# Patient Record
Sex: MALE | Race: WHITE | NOT HISPANIC OR LATINO | Employment: OTHER | URBAN - NONMETROPOLITAN AREA
[De-identification: names, ages, dates, MRNs, and addresses within clinical notes are randomized per-mention and may not be internally consistent; named-entity substitution may affect disease eponyms.]

---

## 2020-02-29 ENCOUNTER — APPOINTMENT (OUTPATIENT)
Dept: GENERAL RADIOLOGY | Facility: HOSPITAL | Age: 85
End: 2020-02-29

## 2020-02-29 ENCOUNTER — HOSPITAL ENCOUNTER (INPATIENT)
Facility: HOSPITAL | Age: 85
LOS: 5 days | Discharge: HOME HEALTH | End: 2020-03-05
Attending: EMERGENCY MEDICINE | Admitting: INTERNAL MEDICINE

## 2020-02-29 ENCOUNTER — APPOINTMENT (OUTPATIENT)
Dept: CT IMAGING | Facility: HOSPITAL | Age: 85
End: 2020-02-29

## 2020-02-29 DIAGNOSIS — R54 ADVANCED AGE: ICD-10-CM

## 2020-02-29 DIAGNOSIS — A41.9 SEPSIS WITH ACUTE HYPOXIC RESPIRATORY FAILURE, DUE TO UNSPECIFIED ORGANISM, UNSPECIFIED WHETHER SEPTIC SHOCK PRESENT (HCC): ICD-10-CM

## 2020-02-29 DIAGNOSIS — D64.9 NORMOCYTIC ANEMIA: ICD-10-CM

## 2020-02-29 DIAGNOSIS — J18.9 COMMUNITY ACQUIRED PNEUMONIA OF RIGHT LOWER LOBE OF LUNG: Primary | ICD-10-CM

## 2020-02-29 DIAGNOSIS — E88.09 HYPOALBUMINEMIA: ICD-10-CM

## 2020-02-29 DIAGNOSIS — R65.20 SEPSIS WITH ACUTE HYPOXIC RESPIRATORY FAILURE, DUE TO UNSPECIFIED ORGANISM, UNSPECIFIED WHETHER SEPTIC SHOCK PRESENT (HCC): ICD-10-CM

## 2020-02-29 DIAGNOSIS — J96.01 SEPSIS WITH ACUTE HYPOXIC RESPIRATORY FAILURE, DUE TO UNSPECIFIED ORGANISM, UNSPECIFIED WHETHER SEPTIC SHOCK PRESENT (HCC): ICD-10-CM

## 2020-02-29 DIAGNOSIS — R53.81 DEBILITY: ICD-10-CM

## 2020-02-29 DIAGNOSIS — J60 BLACK LUNG (HCC): Chronic | ICD-10-CM

## 2020-02-29 DIAGNOSIS — J96.01 ACUTE RESPIRATORY FAILURE WITH HYPOXIA (HCC): ICD-10-CM

## 2020-02-29 PROBLEM — H35.30 MACULAR DEGENERATION: Chronic | Status: ACTIVE | Noted: 2020-02-29

## 2020-02-29 PROBLEM — R77.8 ELEVATED TROPONIN: Status: ACTIVE | Noted: 2020-02-29

## 2020-02-29 PROBLEM — Z87.891 FORMER SMOKER: Chronic | Status: ACTIVE | Noted: 2020-02-29

## 2020-02-29 PROBLEM — I44.7 LBBB (LEFT BUNDLE BRANCH BLOCK): Status: ACTIVE | Noted: 2020-02-29

## 2020-02-29 PROBLEM — J43.9 EMPHYSEMA LUNG (HCC): Chronic | Status: ACTIVE | Noted: 2020-02-29

## 2020-02-29 PROBLEM — E87.20 LACTIC ACIDOSIS: Status: ACTIVE | Noted: 2020-02-29

## 2020-02-29 PROBLEM — Z86.79 HISTORY OF ABDOMINAL AORTIC ANEURYSM (AAA): Chronic | Status: ACTIVE | Noted: 2020-02-29

## 2020-02-29 PROBLEM — R79.89 ELEVATED BRAIN NATRIURETIC PEPTIDE (BNP) LEVEL: Status: ACTIVE | Noted: 2020-02-29

## 2020-02-29 LAB
A-A DO2: 104 MMHG (ref 0–300)
A-A DO2: 50.3 MMHG (ref 0–300)
ALBUMIN SERPL-MCNC: 3.21 G/DL (ref 3.5–5.2)
ALBUMIN/GLOB SERPL: 1 G/DL
ALP SERPL-CCNC: 90 U/L (ref 39–117)
ALT SERPL W P-5'-P-CCNC: 18 U/L (ref 1–41)
ANION GAP SERPL CALCULATED.3IONS-SCNC: 14 MMOL/L (ref 5–15)
ARTERIAL PATENCY WRIST A: ABNORMAL
ARTERIAL PATENCY WRIST A: POSITIVE
AST SERPL-CCNC: 22 U/L (ref 1–40)
ATMOSPHERIC PRESS: 732 MMHG
ATMOSPHERIC PRESS: 733 MMHG
B PERT DNA SPEC QL NAA+PROBE: NOT DETECTED
BASE EXCESS BLDA CALC-SCNC: -0.9 MMOL/L (ref 0–2)
BASE EXCESS BLDA CALC-SCNC: -3 MMOL/L (ref 0–2)
BASOPHILS # BLD AUTO: 0.05 10*3/MM3 (ref 0–0.2)
BASOPHILS NFR BLD AUTO: 0.4 % (ref 0–1.5)
BDY SITE: ABNORMAL
BDY SITE: ABNORMAL
BILIRUB SERPL-MCNC: 0.4 MG/DL (ref 0.2–1.2)
BODY TEMPERATURE: 0 C
BODY TEMPERATURE: 0 C
BUN BLD-MCNC: 18 MG/DL (ref 8–23)
BUN/CREAT SERPL: 18.8 (ref 7–25)
C PNEUM DNA NPH QL NAA+NON-PROBE: NOT DETECTED
CALCIUM SPEC-SCNC: 8.6 MG/DL (ref 8.2–9.6)
CHLORIDE SERPL-SCNC: 105 MMOL/L (ref 98–107)
CO2 BLDA-SCNC: 23.9 MMOL/L (ref 22–33)
CO2 BLDA-SCNC: 24.3 MMOL/L (ref 22–33)
CO2 SERPL-SCNC: 22 MMOL/L (ref 22–29)
COHGB MFR BLD: 1 % (ref 0–5)
COHGB MFR BLD: 1.2 % (ref 0–5)
CREAT BLD-MCNC: 0.96 MG/DL (ref 0.76–1.27)
D-LACTATE SERPL-SCNC: 2.4 MMOL/L (ref 0.5–2)
D-LACTATE SERPL-SCNC: 3.3 MMOL/L (ref 0.5–2)
DEPRECATED RDW RBC AUTO: 50.4 FL (ref 37–54)
EOSINOPHIL # BLD AUTO: 0.1 10*3/MM3 (ref 0–0.4)
EOSINOPHIL NFR BLD AUTO: 0.8 % (ref 0.3–6.2)
ERYTHROCYTE [DISTWIDTH] IN BLOOD BY AUTOMATED COUNT: 14.5 % (ref 12.3–15.4)
FLUAV H1 2009 PAND RNA NPH QL NAA+PROBE: NOT DETECTED
FLUAV H1 HA GENE NPH QL NAA+PROBE: NOT DETECTED
FLUAV H3 RNA NPH QL NAA+PROBE: NOT DETECTED
FLUAV SUBTYP SPEC NAA+PROBE: NOT DETECTED
FLUBV RNA ISLT QL NAA+PROBE: NOT DETECTED
GFR SERPL CREATININE-BSD FRML MDRD: 73 ML/MIN/1.73
GLOBULIN UR ELPH-MCNC: 3.1 GM/DL
GLUCOSE BLD-MCNC: 187 MG/DL (ref 65–99)
HADV DNA SPEC NAA+PROBE: NOT DETECTED
HBA1C MFR BLD: 5.8 % (ref 4.8–5.6)
HCO3 BLDA-SCNC: 22.6 MMOL/L (ref 20–26)
HCO3 BLDA-SCNC: 23.2 MMOL/L (ref 20–26)
HCOV 229E RNA SPEC QL NAA+PROBE: NOT DETECTED
HCOV HKU1 RNA SPEC QL NAA+PROBE: NOT DETECTED
HCOV NL63 RNA SPEC QL NAA+PROBE: NOT DETECTED
HCOV OC43 RNA SPEC QL NAA+PROBE: NOT DETECTED
HCT VFR BLD AUTO: 39.1 % (ref 37.5–51)
HCT VFR BLD CALC: 36.1 % (ref 38–51)
HCT VFR BLD CALC: 38.5 % (ref 38–51)
HGB BLD-MCNC: 11.7 G/DL (ref 13–17.7)
HGB BLDA-MCNC: 11.8 G/DL (ref 14–18)
HGB BLDA-MCNC: 12.6 G/DL (ref 14–18)
HMPV RNA NPH QL NAA+NON-PROBE: NOT DETECTED
HOROWITZ INDEX BLD+IHG-RTO: 21 %
HOROWITZ INDEX BLD+IHG-RTO: 32 %
HPIV1 RNA SPEC QL NAA+PROBE: NOT DETECTED
HPIV2 RNA SPEC QL NAA+PROBE: NOT DETECTED
HPIV3 RNA NPH QL NAA+PROBE: NOT DETECTED
HPIV4 P GENE NPH QL NAA+PROBE: NOT DETECTED
IMM GRANULOCYTES # BLD AUTO: 0.09 10*3/MM3 (ref 0–0.05)
IMM GRANULOCYTES NFR BLD AUTO: 0.7 % (ref 0–0.5)
LACTATE HOLD SPECIMEN: NORMAL
LYMPHOCYTES # BLD AUTO: 1.23 10*3/MM3 (ref 0.7–3.1)
LYMPHOCYTES NFR BLD AUTO: 10 % (ref 19.6–45.3)
Lab: ABNORMAL
M PNEUMO IGG SER IA-ACNC: NOT DETECTED
M PNEUMO IGM SER QL: NEGATIVE
MAGNESIUM SERPL-MCNC: 2 MG/DL (ref 1.7–2.3)
MCH RBC QN AUTO: 28.2 PG (ref 26.6–33)
MCHC RBC AUTO-ENTMCNC: 29.9 G/DL (ref 31.5–35.7)
MCV RBC AUTO: 94.2 FL (ref 79–97)
METHGB BLD QL: 0.1 % (ref 0–3)
METHGB BLD QL: 0.5 % (ref 0–3)
MODALITY: ABNORMAL
MODALITY: ABNORMAL
MONOCYTES # BLD AUTO: 1.06 10*3/MM3 (ref 0.1–0.9)
MONOCYTES NFR BLD AUTO: 8.6 % (ref 5–12)
NEUTROPHILS # BLD AUTO: 9.82 10*3/MM3 (ref 1.7–7)
NEUTROPHILS NFR BLD AUTO: 79.5 % (ref 42.7–76)
NOTE: ABNORMAL
NOTE: ABNORMAL
NOTIFIED BY: ABNORMAL
NOTIFIED WHO: ABNORMAL
NRBC BLD AUTO-RTO: 0 /100 WBC (ref 0–0.2)
NT-PROBNP SERPL-MCNC: 5965 PG/ML (ref 5–1800)
OXYHGB MFR BLDV: 87.6 % (ref 94–99)
OXYHGB MFR BLDV: 92.7 % (ref 94–99)
PCO2 BLDA: 35.8 MM HG (ref 35–45)
PCO2 BLDA: 41.4 MM HG (ref 35–45)
PCO2 TEMP ADJ BLD: ABNORMAL MM[HG]
PCO2 TEMP ADJ BLD: ABNORMAL MM[HG]
PH BLDA: 7.35 PH UNITS (ref 7.35–7.45)
PH BLDA: 7.42 PH UNITS (ref 7.35–7.45)
PH, TEMP CORRECTED: ABNORMAL
PH, TEMP CORRECTED: ABNORMAL
PLATELET # BLD AUTO: 182 10*3/MM3 (ref 140–450)
PMV BLD AUTO: 9.7 FL (ref 6–12)
PO2 BLDA: 53.2 MM HG (ref 83–108)
PO2 BLDA: 69.6 MM HG (ref 83–108)
PO2 TEMP ADJ BLD: ABNORMAL MM[HG]
PO2 TEMP ADJ BLD: ABNORMAL MM[HG]
POTASSIUM BLD-SCNC: 3.7 MMOL/L (ref 3.5–5.2)
PROT SERPL-MCNC: 6.3 G/DL (ref 6–8.5)
RBC # BLD AUTO: 4.15 10*6/MM3 (ref 4.14–5.8)
RHINOVIRUS RNA SPEC NAA+PROBE: NOT DETECTED
RSV RNA NPH QL NAA+NON-PROBE: NOT DETECTED
SAO2 % BLDCOA: 88.9 % (ref 94–99)
SAO2 % BLDCOA: 93.9 % (ref 94–99)
SODIUM BLD-SCNC: 141 MMOL/L (ref 136–145)
TROPONIN T SERPL-MCNC: 0.02 NG/ML (ref 0–0.03)
TROPONIN T SERPL-MCNC: 0.04 NG/ML (ref 0–0.03)
TROPONIN T SERPL-MCNC: 0.06 NG/ML (ref 0–0.03)
TROPONIN T SERPL-MCNC: 0.06 NG/ML (ref 0–0.03)
VENTILATOR MODE: ABNORMAL
VENTILATOR MODE: ABNORMAL
WBC NRBC COR # BLD: 12.35 10*3/MM3 (ref 3.4–10.8)

## 2020-02-29 PROCEDURE — 83605 ASSAY OF LACTIC ACID: CPT | Performed by: EMERGENCY MEDICINE

## 2020-02-29 PROCEDURE — 83036 HEMOGLOBIN GLYCOSYLATED A1C: CPT | Performed by: PHYSICIAN ASSISTANT

## 2020-02-29 PROCEDURE — 82375 ASSAY CARBOXYHB QUANT: CPT

## 2020-02-29 PROCEDURE — 93010 ELECTROCARDIOGRAM REPORT: CPT | Performed by: INTERNAL MEDICINE

## 2020-02-29 PROCEDURE — 80053 COMPREHEN METABOLIC PANEL: CPT | Performed by: EMERGENCY MEDICINE

## 2020-02-29 PROCEDURE — 25010000002 CEFTRIAXONE: Performed by: PHYSICIAN ASSISTANT

## 2020-02-29 PROCEDURE — 83735 ASSAY OF MAGNESIUM: CPT | Performed by: PHYSICIAN ASSISTANT

## 2020-02-29 PROCEDURE — 82805 BLOOD GASES W/O2 SATURATION: CPT

## 2020-02-29 PROCEDURE — 0099U HC BIOFIRE FILMARRAY RESP PANEL 1: CPT | Performed by: INTERNAL MEDICINE

## 2020-02-29 PROCEDURE — 25010000002 PROMETHAZINE PER 50 MG: Performed by: PHYSICIAN ASSISTANT

## 2020-02-29 PROCEDURE — 99285 EMERGENCY DEPT VISIT HI MDM: CPT

## 2020-02-29 PROCEDURE — 36600 WITHDRAWAL OF ARTERIAL BLOOD: CPT

## 2020-02-29 PROCEDURE — 93005 ELECTROCARDIOGRAM TRACING: CPT | Performed by: PHYSICIAN ASSISTANT

## 2020-02-29 PROCEDURE — 94799 UNLISTED PULMONARY SVC/PX: CPT

## 2020-02-29 PROCEDURE — 71045 X-RAY EXAM CHEST 1 VIEW: CPT | Performed by: RADIOLOGY

## 2020-02-29 PROCEDURE — 71250 CT THORAX DX C-: CPT

## 2020-02-29 PROCEDURE — 85025 COMPLETE CBC W/AUTO DIFF WBC: CPT | Performed by: EMERGENCY MEDICINE

## 2020-02-29 PROCEDURE — 83880 ASSAY OF NATRIURETIC PEPTIDE: CPT | Performed by: EMERGENCY MEDICINE

## 2020-02-29 PROCEDURE — 99223 1ST HOSP IP/OBS HIGH 75: CPT | Performed by: INTERNAL MEDICINE

## 2020-02-29 PROCEDURE — 86738 MYCOPLASMA ANTIBODY: CPT | Performed by: INTERNAL MEDICINE

## 2020-02-29 PROCEDURE — 25010000002 HEPARIN (PORCINE) PER 1000 UNITS: Performed by: INTERNAL MEDICINE

## 2020-02-29 PROCEDURE — 83050 HGB METHEMOGLOBIN QUAN: CPT

## 2020-02-29 PROCEDURE — 87040 BLOOD CULTURE FOR BACTERIA: CPT | Performed by: EMERGENCY MEDICINE

## 2020-02-29 PROCEDURE — 84484 ASSAY OF TROPONIN QUANT: CPT | Performed by: INTERNAL MEDICINE

## 2020-02-29 PROCEDURE — 84145 PROCALCITONIN (PCT): CPT | Performed by: INTERNAL MEDICINE

## 2020-02-29 PROCEDURE — 71045 X-RAY EXAM CHEST 1 VIEW: CPT

## 2020-02-29 PROCEDURE — 94640 AIRWAY INHALATION TREATMENT: CPT

## 2020-02-29 PROCEDURE — 84484 ASSAY OF TROPONIN QUANT: CPT | Performed by: EMERGENCY MEDICINE

## 2020-02-29 PROCEDURE — 93005 ELECTROCARDIOGRAM TRACING: CPT | Performed by: INTERNAL MEDICINE

## 2020-02-29 PROCEDURE — 25010000002 LEVOFLOXACIN PER 250 MG: Performed by: EMERGENCY MEDICINE

## 2020-02-29 PROCEDURE — 93005 ELECTROCARDIOGRAM TRACING: CPT | Performed by: EMERGENCY MEDICINE

## 2020-02-29 RX ORDER — FAMOTIDINE 20 MG/1
20 TABLET, FILM COATED ORAL DAILY
Status: DISCONTINUED | OUTPATIENT
Start: 2020-02-29 | End: 2020-03-05 | Stop reason: HOSPADM

## 2020-02-29 RX ORDER — BRIMONIDINE TARTRATE 2 MG/ML
1 SOLUTION/ DROPS OPHTHALMIC 2 TIMES DAILY
Status: ON HOLD | COMMUNITY
End: 2020-02-29

## 2020-02-29 RX ORDER — NITROGLYCERIN 0.4 MG/1
0.4 TABLET SUBLINGUAL
Status: CANCELLED | OUTPATIENT
Start: 2020-02-29

## 2020-02-29 RX ORDER — ACETAMINOPHEN 325 MG/1
650 TABLET ORAL ONCE
Status: COMPLETED | OUTPATIENT
Start: 2020-02-29 | End: 2020-02-29

## 2020-02-29 RX ORDER — METHYLPREDNISOLONE SODIUM SUCCINATE 40 MG/ML
20 INJECTION, POWDER, LYOPHILIZED, FOR SOLUTION INTRAMUSCULAR; INTRAVENOUS ONCE
Status: COMPLETED | OUTPATIENT
Start: 2020-03-01 | End: 2020-02-29

## 2020-02-29 RX ORDER — GUAIFENESIN 200 MG/1
400 TABLET ORAL 2 TIMES DAILY
Status: ON HOLD | COMMUNITY
End: 2020-02-29

## 2020-02-29 RX ORDER — MULTIVITAMIN
1 TABLET ORAL DAILY
Status: DISCONTINUED | OUTPATIENT
Start: 2020-02-29 | End: 2020-03-05 | Stop reason: HOSPADM

## 2020-02-29 RX ORDER — FUROSEMIDE 20 MG/1
20 TABLET ORAL DAILY
Status: CANCELLED | OUTPATIENT
Start: 2020-02-29

## 2020-02-29 RX ORDER — PREDNISONE 20 MG/1
20 TABLET ORAL DAILY
Status: ON HOLD | COMMUNITY
End: 2020-02-29

## 2020-02-29 RX ORDER — POTASSIUM CHLORIDE 750 MG/1
10 TABLET, FILM COATED, EXTENDED RELEASE ORAL DAILY
Status: CANCELLED | OUTPATIENT
Start: 2020-02-29

## 2020-02-29 RX ORDER — NAPROXEN 375 MG/1
375 TABLET ORAL 2 TIMES DAILY PRN
COMMUNITY
End: 2020-03-05 | Stop reason: HOSPADM

## 2020-02-29 RX ORDER — ACETAMINOPHEN 325 MG/1
650 TABLET ORAL EVERY 6 HOURS PRN
Status: DISCONTINUED | OUTPATIENT
Start: 2020-02-29 | End: 2020-03-05 | Stop reason: HOSPADM

## 2020-02-29 RX ORDER — ASPIRIN 81 MG/1
324 TABLET, CHEWABLE ORAL ONCE
Status: DISCONTINUED | OUTPATIENT
Start: 2020-02-29 | End: 2020-02-29

## 2020-02-29 RX ORDER — ALBUTEROL SULFATE 90 UG/1
2 AEROSOL, METERED RESPIRATORY (INHALATION) EVERY 6 HOURS PRN
COMMUNITY

## 2020-02-29 RX ORDER — IPRATROPIUM BROMIDE AND ALBUTEROL SULFATE 2.5; .5 MG/3ML; MG/3ML
3 SOLUTION RESPIRATORY (INHALATION) EVERY 4 HOURS PRN
Status: CANCELLED | OUTPATIENT
Start: 2020-02-29

## 2020-02-29 RX ORDER — AMIODARONE HYDROCHLORIDE 200 MG/1
200 TABLET ORAL DAILY
COMMUNITY
End: 2020-03-05 | Stop reason: HOSPADM

## 2020-02-29 RX ORDER — POTASSIUM CHLORIDE 750 MG/1
10 TABLET, FILM COATED, EXTENDED RELEASE ORAL DAILY
COMMUNITY

## 2020-02-29 RX ORDER — PROMETHAZINE HYDROCHLORIDE 25 MG/ML
INJECTION, SOLUTION INTRAMUSCULAR; INTRAVENOUS
Status: DISPENSED
Start: 2020-02-29 | End: 2020-03-01

## 2020-02-29 RX ORDER — SODIUM CHLORIDE 0.9 % (FLUSH) 0.9 %
10 SYRINGE (ML) INJECTION AS NEEDED
Status: DISCONTINUED | OUTPATIENT
Start: 2020-02-29 | End: 2020-03-05 | Stop reason: HOSPADM

## 2020-02-29 RX ORDER — ATORVASTATIN CALCIUM 40 MG/1
40 TABLET, FILM COATED ORAL NIGHTLY
Status: DISCONTINUED | OUTPATIENT
Start: 2020-02-29 | End: 2020-03-05 | Stop reason: HOSPADM

## 2020-02-29 RX ORDER — HYDROXYZINE HYDROCHLORIDE 25 MG/1
25 TABLET, FILM COATED ORAL 3 TIMES DAILY PRN
Status: DISCONTINUED | OUTPATIENT
Start: 2020-02-29 | End: 2020-03-05 | Stop reason: HOSPADM

## 2020-02-29 RX ORDER — AMOXICILLIN 500 MG/1
1000 CAPSULE ORAL 2 TIMES DAILY
Status: ON HOLD | COMMUNITY
End: 2020-02-29

## 2020-02-29 RX ORDER — CEFDINIR 300 MG/1
300 CAPSULE ORAL 2 TIMES DAILY
Status: ON HOLD | COMMUNITY
End: 2020-02-29

## 2020-02-29 RX ORDER — DOXYCYCLINE HYCLATE 100 MG/1
100 CAPSULE ORAL 2 TIMES DAILY
Status: ON HOLD | COMMUNITY
End: 2020-02-29

## 2020-02-29 RX ORDER — FUROSEMIDE 20 MG/1
20 TABLET ORAL DAILY
COMMUNITY
End: 2020-03-05 | Stop reason: HOSPADM

## 2020-02-29 RX ORDER — ACETAMINOPHEN AND CODEINE PHOSPHATE 300; 30 MG/1; MG/1
1 TABLET ORAL EVERY 8 HOURS PRN
Status: ON HOLD | COMMUNITY
End: 2020-02-29

## 2020-02-29 RX ORDER — NITROGLYCERIN 0.4 MG/1
0.4 TABLET SUBLINGUAL
COMMUNITY

## 2020-02-29 RX ORDER — LEVOFLOXACIN 5 MG/ML
750 INJECTION, SOLUTION INTRAVENOUS ONCE
Status: COMPLETED | OUTPATIENT
Start: 2020-02-29 | End: 2020-02-29

## 2020-02-29 RX ORDER — SODIUM CHLORIDE 0.9 % (FLUSH) 0.9 %
10 SYRINGE (ML) INJECTION EVERY 12 HOURS SCHEDULED
Status: DISCONTINUED | OUTPATIENT
Start: 2020-02-29 | End: 2020-03-05 | Stop reason: HOSPADM

## 2020-02-29 RX ORDER — NAPROXEN 250 MG/1
375 TABLET ORAL 2 TIMES DAILY PRN
Status: CANCELLED | OUTPATIENT
Start: 2020-02-29

## 2020-02-29 RX ORDER — NITROGLYCERIN 0.4 MG/1
0.4 TABLET SUBLINGUAL
Status: DISCONTINUED | OUTPATIENT
Start: 2020-02-29 | End: 2020-03-05 | Stop reason: HOSPADM

## 2020-02-29 RX ORDER — GUAIFENESIN 600 MG/1
600 TABLET, EXTENDED RELEASE ORAL EVERY 12 HOURS SCHEDULED
Status: DISCONTINUED | OUTPATIENT
Start: 2020-03-01 | End: 2020-03-05 | Stop reason: HOSPADM

## 2020-02-29 RX ORDER — ASPIRIN 81 MG/1
81 TABLET, CHEWABLE ORAL DAILY
Status: DISCONTINUED | OUTPATIENT
Start: 2020-02-29 | End: 2020-03-05 | Stop reason: HOSPADM

## 2020-02-29 RX ORDER — IPRATROPIUM BROMIDE AND ALBUTEROL SULFATE 2.5; .5 MG/3ML; MG/3ML
3 SOLUTION RESPIRATORY (INHALATION) EVERY 4 HOURS PRN
COMMUNITY

## 2020-02-29 RX ORDER — HEPARIN SODIUM 5000 [USP'U]/ML
5000 INJECTION, SOLUTION INTRAVENOUS; SUBCUTANEOUS EVERY 8 HOURS SCHEDULED
Status: DISCONTINUED | OUTPATIENT
Start: 2020-02-29 | End: 2020-03-05 | Stop reason: HOSPADM

## 2020-02-29 RX ADMIN — LEVOFLOXACIN 750 MG: 5 INJECTION, SOLUTION INTRAVENOUS at 13:52

## 2020-02-29 RX ADMIN — ATORVASTATIN CALCIUM 40 MG: 40 TABLET, FILM COATED ORAL at 20:59

## 2020-02-29 RX ADMIN — SODIUM CHLORIDE, PRESERVATIVE FREE 10 ML: 5 INJECTION INTRAVENOUS at 20:51

## 2020-02-29 RX ADMIN — IPRATROPIUM BROMIDE 0.5 MG: 0.5 SOLUTION RESPIRATORY (INHALATION) at 23:07

## 2020-02-29 RX ADMIN — ACETAMINOPHEN 650 MG: 325 TABLET ORAL at 09:21

## 2020-02-29 RX ADMIN — Medication 1 TABLET: at 18:13

## 2020-02-29 RX ADMIN — FAMOTIDINE 20 MG: 20 TABLET, FILM COATED ORAL at 20:50

## 2020-02-29 RX ADMIN — SODIUM CHLORIDE 1000 ML: 9 INJECTION, SOLUTION INTRAVENOUS at 08:15

## 2020-02-29 RX ADMIN — IPRATROPIUM BROMIDE 0.5 MG: 0.5 SOLUTION RESPIRATORY (INHALATION) at 19:28

## 2020-02-29 RX ADMIN — ASPIRIN 81 MG: 81 TABLET, CHEWABLE ORAL at 21:00

## 2020-02-29 RX ADMIN — NITROGLYCERIN 0.5 INCH: 20 OINTMENT TOPICAL at 15:47

## 2020-02-29 RX ADMIN — CEFTRIAXONE 1 G: 1 INJECTION, POWDER, FOR SOLUTION INTRAMUSCULAR; INTRAVENOUS at 20:50

## 2020-02-29 RX ADMIN — HEPARIN SODIUM 5000 UNITS: 5000 INJECTION INTRAVENOUS; SUBCUTANEOUS at 21:00

## 2020-02-29 RX ADMIN — SODIUM CHLORIDE 839 ML: 9 INJECTION, SOLUTION INTRAVENOUS at 13:51

## 2020-02-29 RX ADMIN — DOXYCYCLINE 100 MG: 100 INJECTION, POWDER, LYOPHILIZED, FOR SOLUTION INTRAVENOUS at 20:50

## 2020-02-29 RX ADMIN — METHYLPREDNISOLONE SODIUM SUCCINATE 20 MG: 40 INJECTION, POWDER, FOR SOLUTION INTRAMUSCULAR; INTRAVENOUS at 23:32

## 2020-02-29 RX ADMIN — PROMETHAZINE HYDROCHLORIDE 12.5 MG: 25 INJECTION INTRAMUSCULAR; INTRAVENOUS at 23:32

## 2020-03-01 ENCOUNTER — APPOINTMENT (OUTPATIENT)
Dept: CARDIOLOGY | Facility: HOSPITAL | Age: 85
End: 2020-03-01

## 2020-03-01 LAB
ANION GAP SERPL CALCULATED.3IONS-SCNC: 13.8 MMOL/L (ref 5–15)
BASOPHILS # BLD AUTO: 0.06 10*3/MM3 (ref 0–0.2)
BASOPHILS NFR BLD AUTO: 0.4 % (ref 0–1.5)
BH CV ECHO MEAS - ACS: 0.8 CM
BH CV ECHO MEAS - AI DEC SLOPE: 460 CM/SEC^2
BH CV ECHO MEAS - AI MAX PG: 59.9 MMHG
BH CV ECHO MEAS - AI MAX VEL: 387 CM/SEC
BH CV ECHO MEAS - AI P1/2T: 246.4 MSEC
BH CV ECHO MEAS - AO MAX PG: 19 MMHG
BH CV ECHO MEAS - AO MEAN PG: 10 MMHG
BH CV ECHO MEAS - AO ROOT AREA (BSA CORRECTED): 2.1
BH CV ECHO MEAS - AO ROOT AREA: 10.8 CM^2
BH CV ECHO MEAS - AO ROOT DIAM: 3.7 CM
BH CV ECHO MEAS - AO V2 MAX: 218 CM/SEC
BH CV ECHO MEAS - AO V2 MEAN: 144 CM/SEC
BH CV ECHO MEAS - AO V2 VTI: 36.4 CM
BH CV ECHO MEAS - BSA(HAYCOCK): 1.8 M^2
BH CV ECHO MEAS - BSA: 1.8 M^2
BH CV ECHO MEAS - BZI_BMI: 21.9 KILOGRAMS/M^2
BH CV ECHO MEAS - BZI_METRIC_HEIGHT: 172.7 CM
BH CV ECHO MEAS - BZI_METRIC_WEIGHT: 65.3 KG
BH CV ECHO MEAS - EDV(CUBED): 219.3 ML
BH CV ECHO MEAS - EDV(MOD-SP4): 96 ML
BH CV ECHO MEAS - EDV(TEICH): 182.1 ML
BH CV ECHO MEAS - EF(CUBED): 65.2 %
BH CV ECHO MEAS - EF(MOD-BP): 38 %
BH CV ECHO MEAS - EF(MOD-SP4): 38.3 %
BH CV ECHO MEAS - EF(TEICH): 55.9 %
BH CV ECHO MEAS - ESV(CUBED): 76.2 ML
BH CV ECHO MEAS - ESV(MOD-SP4): 59.2 ML
BH CV ECHO MEAS - ESV(TEICH): 80.4 ML
BH CV ECHO MEAS - FS: 29.7 %
BH CV ECHO MEAS - IVS/LVPW: 0.75
BH CV ECHO MEAS - IVSD: 0.92 CM
BH CV ECHO MEAS - LA DIMENSION: 4.1 CM
BH CV ECHO MEAS - LA/AO: 1.1
BH CV ECHO MEAS - LV DIASTOLIC VOL/BSA (35-75): 54 ML/M^2
BH CV ECHO MEAS - LV MASS(C)D: 274.2 GRAMS
BH CV ECHO MEAS - LV MASS(C)DI: 154.3 GRAMS/M^2
BH CV ECHO MEAS - LV SYSTOLIC VOL/BSA (12-30): 33.3 ML/M^2
BH CV ECHO MEAS - LVIDD: 6 CM
BH CV ECHO MEAS - LVIDS: 4.2 CM
BH CV ECHO MEAS - LVLD AP4: 7.9 CM
BH CV ECHO MEAS - LVLS AP4: 7.4 CM
BH CV ECHO MEAS - LVOT AREA (M): 2.8 CM^2
BH CV ECHO MEAS - LVOT AREA: 2.8 CM^2
BH CV ECHO MEAS - LVOT DIAM: 1.9 CM
BH CV ECHO MEAS - LVPWD: 1.2 CM
BH CV ECHO MEAS - MV E MAX VEL: 133 CM/SEC
BH CV ECHO MEAS - PA ACC TIME: 0.12 SEC
BH CV ECHO MEAS - PA PR(ACCEL): 23.7 MMHG
BH CV ECHO MEAS - RAP SYSTOLE: 10 MMHG
BH CV ECHO MEAS - RVSP: 44.3 MMHG
BH CV ECHO MEAS - SI(AO): 220.2 ML/M^2
BH CV ECHO MEAS - SI(CUBED): 80.5 ML/M^2
BH CV ECHO MEAS - SI(MOD-SP4): 20.7 ML/M^2
BH CV ECHO MEAS - SI(TEICH): 57.2 ML/M^2
BH CV ECHO MEAS - SV(AO): 391.4 ML
BH CV ECHO MEAS - SV(CUBED): 143 ML
BH CV ECHO MEAS - SV(MOD-SP4): 36.8 ML
BH CV ECHO MEAS - SV(TEICH): 101.7 ML
BH CV ECHO MEAS - TR MAX VEL: 293 CM/SEC
BUN BLD-MCNC: 17 MG/DL (ref 8–23)
BUN/CREAT SERPL: 18.5 (ref 7–25)
CALCIUM SPEC-SCNC: 8.8 MG/DL (ref 8.2–9.6)
CHLORIDE SERPL-SCNC: 107 MMOL/L (ref 98–107)
CO2 SERPL-SCNC: 23.2 MMOL/L (ref 22–29)
CREAT BLD-MCNC: 0.92 MG/DL (ref 0.76–1.27)
DEPRECATED RDW RBC AUTO: 50.2 FL (ref 37–54)
EOSINOPHIL # BLD AUTO: 0.03 10*3/MM3 (ref 0–0.4)
EOSINOPHIL NFR BLD AUTO: 0.2 % (ref 0.3–6.2)
ERYTHROCYTE [DISTWIDTH] IN BLOOD BY AUTOMATED COUNT: 14.4 % (ref 12.3–15.4)
GFR SERPL CREATININE-BSD FRML MDRD: 76 ML/MIN/1.73
GLUCOSE BLD-MCNC: 154 MG/DL (ref 65–99)
HCT VFR BLD AUTO: 40.6 % (ref 37.5–51)
HGB BLD-MCNC: 12.1 G/DL (ref 13–17.7)
IMM GRANULOCYTES # BLD AUTO: 0.13 10*3/MM3 (ref 0–0.05)
IMM GRANULOCYTES NFR BLD AUTO: 0.8 % (ref 0–0.5)
LYMPHOCYTES # BLD AUTO: 0.5 10*3/MM3 (ref 0.7–3.1)
LYMPHOCYTES NFR BLD AUTO: 3.1 % (ref 19.6–45.3)
MAGNESIUM SERPL-MCNC: 2 MG/DL (ref 1.7–2.3)
MAXIMAL PREDICTED HEART RATE: 124 BPM
MCH RBC QN AUTO: 28 PG (ref 26.6–33)
MCHC RBC AUTO-ENTMCNC: 29.8 G/DL (ref 31.5–35.7)
MCV RBC AUTO: 94 FL (ref 79–97)
MONOCYTES # BLD AUTO: 0.82 10*3/MM3 (ref 0.1–0.9)
MONOCYTES NFR BLD AUTO: 5.1 % (ref 5–12)
NEUTROPHILS # BLD AUTO: 14.55 10*3/MM3 (ref 1.7–7)
NEUTROPHILS NFR BLD AUTO: 90.4 % (ref 42.7–76)
NRBC BLD AUTO-RTO: 0 /100 WBC (ref 0–0.2)
NT-PROBNP SERPL-MCNC: 7741 PG/ML (ref 5–1800)
PHOSPHATE SERPL-MCNC: 3.1 MG/DL (ref 2.5–4.5)
PLATELET # BLD AUTO: 213 10*3/MM3 (ref 140–450)
PMV BLD AUTO: 9.6 FL (ref 6–12)
POTASSIUM BLD-SCNC: 4.3 MMOL/L (ref 3.5–5.2)
PROCALCITONIN SERPL-MCNC: 0.14 NG/ML (ref 0.1–0.25)
RBC # BLD AUTO: 4.32 10*6/MM3 (ref 4.14–5.8)
SODIUM BLD-SCNC: 144 MMOL/L (ref 136–145)
STRESS TARGET HR: 105 BPM
TROPONIN T SERPL-MCNC: 0.06 NG/ML (ref 0–0.03)
TROPONIN T SERPL-MCNC: 0.06 NG/ML (ref 0–0.03)
TROPONIN T SERPL-MCNC: 0.07 NG/ML (ref 0–0.03)
TSH SERPL DL<=0.05 MIU/L-ACNC: 2.4 UIU/ML (ref 0.27–4.2)
WBC NRBC COR # BLD: 16.09 10*3/MM3 (ref 3.4–10.8)

## 2020-03-01 PROCEDURE — 99222 1ST HOSP IP/OBS MODERATE 55: CPT | Performed by: INTERNAL MEDICINE

## 2020-03-01 PROCEDURE — 84443 ASSAY THYROID STIM HORMONE: CPT | Performed by: INTERNAL MEDICINE

## 2020-03-01 PROCEDURE — 93306 TTE W/DOPPLER COMPLETE: CPT

## 2020-03-01 PROCEDURE — 84100 ASSAY OF PHOSPHORUS: CPT | Performed by: PHYSICIAN ASSISTANT

## 2020-03-01 PROCEDURE — 84484 ASSAY OF TROPONIN QUANT: CPT | Performed by: PHYSICIAN ASSISTANT

## 2020-03-01 PROCEDURE — 25010000002 HEPARIN (PORCINE) PER 1000 UNITS: Performed by: INTERNAL MEDICINE

## 2020-03-01 PROCEDURE — 25010000002 CEFTRIAXONE: Performed by: PHYSICIAN ASSISTANT

## 2020-03-01 PROCEDURE — 93325 DOPPLER ECHO COLOR FLOW MAPG: CPT | Performed by: INTERNAL MEDICINE

## 2020-03-01 PROCEDURE — 93005 ELECTROCARDIOGRAM TRACING: CPT | Performed by: PHYSICIAN ASSISTANT

## 2020-03-01 PROCEDURE — 94799 UNLISTED PULMONARY SVC/PX: CPT

## 2020-03-01 PROCEDURE — 93308 TTE F-UP OR LMTD: CPT | Performed by: INTERNAL MEDICINE

## 2020-03-01 PROCEDURE — 83735 ASSAY OF MAGNESIUM: CPT | Performed by: PHYSICIAN ASSISTANT

## 2020-03-01 PROCEDURE — 93010 ELECTROCARDIOGRAM REPORT: CPT | Performed by: INTERNAL MEDICINE

## 2020-03-01 PROCEDURE — 99233 SBSQ HOSP IP/OBS HIGH 50: CPT | Performed by: PHYSICIAN ASSISTANT

## 2020-03-01 PROCEDURE — 25010000002 METHYLPREDNISOLONE PER 40 MG: Performed by: PHYSICIAN ASSISTANT

## 2020-03-01 PROCEDURE — 93321 DOPPLER ECHO F-UP/LMTD STD: CPT | Performed by: INTERNAL MEDICINE

## 2020-03-01 PROCEDURE — 85025 COMPLETE CBC W/AUTO DIFF WBC: CPT | Performed by: INTERNAL MEDICINE

## 2020-03-01 PROCEDURE — 25010000002 FUROSEMIDE PER 20 MG: Performed by: PHYSICIAN ASSISTANT

## 2020-03-01 PROCEDURE — 80048 BASIC METABOLIC PNL TOTAL CA: CPT | Performed by: INTERNAL MEDICINE

## 2020-03-01 PROCEDURE — 83880 ASSAY OF NATRIURETIC PEPTIDE: CPT | Performed by: PHYSICIAN ASSISTANT

## 2020-03-01 RX ORDER — AMIODARONE HYDROCHLORIDE 200 MG/1
200 TABLET ORAL DAILY
Status: DISCONTINUED | OUTPATIENT
Start: 2020-03-01 | End: 2020-03-01

## 2020-03-01 RX ORDER — CARVEDILOL 3.12 MG/1
3.12 TABLET ORAL 2 TIMES DAILY WITH MEALS
Status: DISCONTINUED | OUTPATIENT
Start: 2020-03-01 | End: 2020-03-05 | Stop reason: HOSPADM

## 2020-03-01 RX ORDER — IPRATROPIUM BROMIDE AND ALBUTEROL SULFATE 2.5; .5 MG/3ML; MG/3ML
3 SOLUTION RESPIRATORY (INHALATION) EVERY 4 HOURS PRN
Status: DISCONTINUED | OUTPATIENT
Start: 2020-03-01 | End: 2020-03-05 | Stop reason: HOSPADM

## 2020-03-01 RX ORDER — QUETIAPINE FUMARATE 25 MG/1
25 TABLET, FILM COATED ORAL ONCE
Status: COMPLETED | OUTPATIENT
Start: 2020-03-01 | End: 2020-03-01

## 2020-03-01 RX ORDER — FUROSEMIDE 10 MG/ML
40 INJECTION INTRAMUSCULAR; INTRAVENOUS ONCE
Status: COMPLETED | OUTPATIENT
Start: 2020-03-01 | End: 2020-03-01

## 2020-03-01 RX ORDER — TORSEMIDE 20 MG/1
20 TABLET ORAL DAILY
Status: DISCONTINUED | OUTPATIENT
Start: 2020-03-01 | End: 2020-03-02

## 2020-03-01 RX ORDER — ALBUTEROL SULFATE 2.5 MG/3ML
2.5 SOLUTION RESPIRATORY (INHALATION) EVERY 6 HOURS PRN
Status: DISCONTINUED | OUTPATIENT
Start: 2020-03-01 | End: 2020-03-05 | Stop reason: HOSPADM

## 2020-03-01 RX ADMIN — Medication 1 TABLET: at 09:03

## 2020-03-01 RX ADMIN — SACUBITRIL AND VALSARTAN 1 TABLET: 24; 26 TABLET, FILM COATED ORAL at 21:09

## 2020-03-01 RX ADMIN — IPRATROPIUM BROMIDE 0.5 MG: 0.5 SOLUTION RESPIRATORY (INHALATION) at 18:54

## 2020-03-01 RX ADMIN — ASPIRIN 81 MG: 81 TABLET, CHEWABLE ORAL at 09:03

## 2020-03-01 RX ADMIN — CARVEDILOL 3.12 MG: 3.12 TABLET, FILM COATED ORAL at 12:15

## 2020-03-01 RX ADMIN — GUAIFENESIN 600 MG: 600 TABLET, EXTENDED RELEASE ORAL at 00:32

## 2020-03-01 RX ADMIN — TORSEMIDE 20 MG: 20 TABLET ORAL at 12:15

## 2020-03-01 RX ADMIN — CARVEDILOL 3.12 MG: 3.12 TABLET, FILM COATED ORAL at 16:12

## 2020-03-01 RX ADMIN — ACETAMINOPHEN 650 MG: 325 TABLET ORAL at 21:09

## 2020-03-01 RX ADMIN — SACUBITRIL AND VALSARTAN 1 TABLET: 24; 26 TABLET, FILM COATED ORAL at 12:15

## 2020-03-01 RX ADMIN — HEPARIN SODIUM 5000 UNITS: 5000 INJECTION INTRAVENOUS; SUBCUTANEOUS at 21:10

## 2020-03-01 RX ADMIN — HYDROXYZINE HYDROCHLORIDE 25 MG: 25 TABLET, FILM COATED ORAL at 21:09

## 2020-03-01 RX ADMIN — SODIUM CHLORIDE, PRESERVATIVE FREE 10 ML: 5 INJECTION INTRAVENOUS at 21:10

## 2020-03-01 RX ADMIN — QUETIAPINE FUMARATE 25 MG: 25 TABLET, FILM COATED ORAL at 01:30

## 2020-03-01 RX ADMIN — HEPARIN SODIUM 5000 UNITS: 5000 INJECTION INTRAVENOUS; SUBCUTANEOUS at 05:04

## 2020-03-01 RX ADMIN — GUAIFENESIN 600 MG: 600 TABLET, EXTENDED RELEASE ORAL at 21:09

## 2020-03-01 RX ADMIN — ATORVASTATIN CALCIUM 40 MG: 40 TABLET, FILM COATED ORAL at 21:07

## 2020-03-01 RX ADMIN — FUROSEMIDE 40 MG: 10 INJECTION, SOLUTION INTRAMUSCULAR; INTRAVENOUS at 09:03

## 2020-03-01 RX ADMIN — GUAIFENESIN 600 MG: 600 TABLET, EXTENDED RELEASE ORAL at 09:03

## 2020-03-01 RX ADMIN — IPRATROPIUM BROMIDE 0.5 MG: 0.5 SOLUTION RESPIRATORY (INHALATION) at 06:43

## 2020-03-01 RX ADMIN — DOXYCYCLINE 100 MG: 100 INJECTION, POWDER, LYOPHILIZED, FOR SOLUTION INTRAVENOUS at 09:08

## 2020-03-01 RX ADMIN — CEFTRIAXONE 1 G: 1 INJECTION, POWDER, FOR SOLUTION INTRAMUSCULAR; INTRAVENOUS at 21:07

## 2020-03-01 RX ADMIN — HYDROXYZINE HYDROCHLORIDE 25 MG: 25 TABLET, FILM COATED ORAL at 00:01

## 2020-03-01 RX ADMIN — IPRATROPIUM BROMIDE 0.5 MG: 0.5 SOLUTION RESPIRATORY (INHALATION) at 13:17

## 2020-03-01 RX ADMIN — FAMOTIDINE 20 MG: 20 TABLET, FILM COATED ORAL at 09:03

## 2020-03-01 NOTE — PLAN OF CARE
Patient confused during shift. New medications ordered per cardiology for heart failure. Bed alarm on due to high fall risk and confusion.

## 2020-03-01 NOTE — CONSULTS
Patient Name: Rafael Hussein  Age/Sex: 89 y.o. male  : 1923  MRN: [id number]    Date of Hospital Visit: 2020  Encounter Provider: Philip Forrest MD  Referring Provider: No ref. provider found         Subjective:       Chief Complaint: CHF    History of Present Illness:  Rafael Hussein is a 89 y.o. male who came in to the hospital with SOB and chest pain, the history is not clear due to his confusion. CXR consistent with CHF and EKG sinus with LBBB his Troponin are mildly elevated and flat. He apparently has CAD and prior stents,  His echo today showed Ef 25 to 305 with global HK, Moderate AI and moderate MR, , will treat as HF low EF, will add Entresto, Coreg, low dose due to electrical abnormalities, and diuretics, he did received lasix 40 Iv today.. At the time of my visit,he denies any chest pain or SOB, no edema will also add torsemide 20 mg, reviewing his home meds, he seems was taking Amiodarone ,/ but no anticoagulation no family members, at bedside,       Past Medical History:  Past Medical History:   Diagnosis Date   • Aortic aneurysm (CMS/Formerly [facility])    • Black lung (CMS/Formerly [facility])    • Emphysema lung (CMS/Formerly [facility]) 2020   • History of abdominal aortic aneurysm (AAA) 2020   • Macular degeneration 2020       Past Surgical History:   Procedure Laterality Date   • ABDOMINAL AORTIC ANEURYSM REPAIR     • CARDIAC CATHETERIZATION      x3        Home Medications:    Medications Prior to Admission   Medication Sig Dispense Refill Last Dose   • amiodarone (PACERONE) 200 MG tablet Take 200 mg by mouth Daily.   2020 at am   • furosemide (LASIX) 20 MG tablet Take 20 mg by mouth Daily.   2020 at am   • ipratropium-albuterol (DUO-NEB) 0.5-2.5 mg/3 ml nebulizer Take 3 mL by nebulization Every 4 (Four) Hours As Needed for Wheezing.   2020 at pm   • naproxen (NAPROSYN) 375 MG tablet Take 375 mg by mouth 2 (Two) Times a Day As Needed for Mild Pain .   2020 at pm   • potassium chloride  (K-DUR) 10 MEQ CR tablet Take 10 mEq by mouth Daily.   2020 at pm   • albuterol sulfate  (90 Base) MCG/ACT inhaler Inhale 2 puffs Every 6 (Six) Hours As Needed for Wheezing.   Unknown at Unknown time   • nitroglycerin (NITROSTAT) 0.4 MG SL tablet Place 0.4 mg under the tongue Every 5 (Five) Minutes As Needed for Chest Pain. Take no more than 3 doses in 15 minutes.   Unknown at Unknown time       Allergies:  Allergies   Allergen Reactions   • Penicillins Unknown - Low Severity       Past Social History:  Social History     Socioeconomic History   • Marital status:      Spouse name: Not on file   • Number of children: Not on file   • Years of education: Not on file   • Highest education level: Not on file   Tobacco Use   • Smoking status: Former Smoker     Types: Cigarettes, Pipe     Last attempt to quit: 1989     Years since quittin.0   • Smokeless tobacco: Never Used   Substance and Sexual Activity   • Alcohol use: Never     Frequency: Never   • Drug use: Never   • Sexual activity: Defer       Past Family History:  History reviewed. No pertinent family history.    Review of Systems:   Constitution: No chills, no rigors, no unexplained weight loss or weight gain  Eyes:  No diplopia, no blurred vision, no loss of vision, conjunctivae pink and sclerae anicteric  ENT:  No tinnitus, no otorrhea, no epistaxis, no sore throat   Respiratory: No cough, no hemoptysis  Cardiovascular: see HPI  Gastrointestinal: No nausea, no vomiting, no hematemesis, no diarrhea or constipation, no melena  Genitourinary: No frequency of dysuria no hematuria  Integument: No pruritis and  no skin rash  Hematologic / Lymphatic: No excessive bleeding, easy bruising, fatigue, lymphadenopathy and petechiae  Musculoskeletal: No joint pain, joint stiffness, joint swelling, muscle pain, muscle weakness and neck pain  Neurological: No dizziness, headaches, light headedness, seizures and vertigo  Endocrine: No frequent  urination and nocturia, temperature intolerance, weight gain, unintended and weight loss, unintended      Objective:     Objective:  Vital Signs (last 24 hours)       02/29 0700  -  03/01 0659 03/01 0700  -  03/01 1056   Most Recent    Temp (°F) 97.3 -  98.4      98.2     98.2 (36.8)    Heart Rate 63 -  111      93     93    Resp 16 -  24      20     20    BP 70/53 -  178/98      125/89     125/89    SpO2 (%) 85 -  100      96     96          Body mass index is 21.94 kg/m².  Weight change:           PHYSICAL EXAM:    General: Alert and oriented, no acute distress  Head: Normocephalic, without obvious abnormality, atraumatic  Neck: Supple, trachea midline, no JVD  Lungs: Crackles  Both bases respirations regular, even and unlabored  Heart: Regular rate and rhythm, normal S1 and S2, no rub, murmur, or gallop,    Chest wall: No abnormalities observed  Abdomen:Normal bowel sounds,  non-distended  Extremities:Moves all extremities well, no edema, no cyanosis, no redness  Pulses: Pulses palpable and equal bilaterally  Skin: No bleeding, bruising or rash  Neurologic: A & O x 3     Access:    Lab Review:     Results from last 7 days   Lab Units 03/01/20  0136 02/29/20  0845   SODIUM mmol/L 144 141   POTASSIUM mmol/L 4.3 3.7   CHLORIDE mmol/L 107 105   CO2 mmol/L 23.2 22.0   BUN mg/dL 17 18   CREATININE mg/dL 0.92 0.96   CALCIUM mg/dL 8.8 8.6   BILIRUBIN mg/dL  --  0.4   ALK PHOS U/L  --  90   ALT (SGPT) U/L  --  18   AST (SGOT) U/L  --  22   GLUCOSE mg/dL 154* 187*     Results from last 7 days   Lab Units 03/01/20  0725 03/01/20  0136 02/29/20  2243   TROPONIN T ng/mL 0.056* 0.063* 0.056*         Results from last 7 days   Lab Units 03/01/20  0136   WBC 10*3/mm3 16.09*   HEMOGLOBIN g/dL 12.1*   HEMATOCRIT % 40.6   PLATELETS 10*3/mm3 213         Results from last 7 days   Lab Units 03/01/20  0136   MAGNESIUM mg/dL 2.0         Results from last 7 days   Lab Units 03/01/20  0136   TSH uIU/mL 2.400       EKG:   ECG/EMG Results  (last 24 hours)     Procedure Component Value Units Date/Time    ECG 12 Lead [[id number]] Collected:  02/29/20 1859     Updated:  02/29/20 1900    Narrative:       Test Reason : trop  Blood Pressure : **/** mmHG  Vent. Rate : 073 BPM     Atrial Rate : 073 BPM     P-R Int : 184 ms          QRS Dur : 166 ms      QT Int : 470 ms       P-R-T Axes : 077 011 189 degrees     QTc Int : 517 ms    Sinus rhythm with marked sinus arrhythmia  Left bundle branch block  Abnormal ECG  When compared with ECG of 29-FEB-2020 08:02, (Unconfirmed)  Sinus rhythm has replaced Wide QRS rhythm    Referred By:  JACLYN           Confirmed By:     ECG 12 Lead [[id number]] Collected:  02/29/20 2316     Updated:  02/29/20 2317    Narrative:       Test Reason : change  Blood Pressure : **/** mmHG  Vent. Rate : 112 BPM     Atrial Rate : 122 BPM     P-R Int : 000 ms          QRS Dur : 148 ms      QT Int : 380 ms       P-R-T Axes : 000 060 -22 degrees     QTc Int : 518 ms    Wide QRS rhythm  Nonspecific intraventricular block  Abnormal ECG  When compared with ECG of 29-FEB-2020 18:59, (Unconfirmed)  Wide QRS rhythm has replaced Sinus rhythm  Vent. rate has increased BY  39 BPM    Referred By:  DAJA           Confirmed By:     ECG 12 Lead [[id number]] Collected:  03/01/20 0813     Updated:  03/01/20 0814    Narrative:       Test Reason : LBBB  Blood Pressure : **/** mmHG  Vent. Rate : 086 BPM     Atrial Rate : 086 BPM     P-R Int : 188 ms          QRS Dur : 164 ms      QT Int : 444 ms       P-R-T Axes : 080 134 002 degrees     QTc Int : 531 ms    Normal sinus rhythm  Right axis deviation  Left bundle branch block  Abnormal ECG  When compared with ECG of 29-FEB-2020 23:16, (Unconfirmed)  Sinus rhythm has replaced Wide QRS rhythm    Referred By:  JACLYN           Confirmed By:     Transthoracic Echo Complete With Contrast if Necessary Per Protocol [[id number]] Resulted:  03/01/20 0944     Updated:  03/01/20 0944          Imaging:  Imaging Results (Last 24  Hours)     Procedure Component Value Units Date/Time    XR Chest 1 View [[id number]] Collected:  02/29/20 2358     Updated:  03/01/20 0000    Narrative:       CR Chest 1 Vw    INDICATION:   Shortness of air today     COMPARISON:    Chest CT 2/29/2020    FINDINGS:  Single portable AP view(s) of the chest.        Heart size is normal. Mild interstitial prominence is present. There are no focal infiltrates.       Impression:       Mild diffuse interstitial prominence without focal infiltrates    Signer Name: Manoj Bragg MD   Signed: 2/29/2020 11:58 PM   Workstation Name: [id number]    [facility]    CT Chest Without Contrast [[id number]] Collected:  02/29/20 2157     Updated:  02/29/20 2159    Narrative:       CT scan of the chest without contrast 2/29/2020    INDICATION:  Set shortness of breath and cough today. Lobar pneumonia, respiratory failure with hypoxia and sepsis.    TECHNIQUE:   CT of the thorax without contrast. Coronal and sagittal reconstructions were obtained.  Radiation dose reduction techniques included automated exposure control or exposure modulation based on body size. Radiation audit for number of CT and nuclear  cardiology exams performed in the last year: 0.      COMPARISON:   None available.    FINDINGS:  There is diffuse centrilobular emphysema. Moderate bilateral pleural effusions, right greater than left with bibasilar atelectasis or infiltrates. Smaller multifocal patchy infiltrates are seen in the upper lobes bilaterally, left greater than right.  There is no significant thoracic lymphadenopathy. The heart is normal in size. There is coronary artery calcification. Recommend clinical correlation for coronary artery disease. Small hiatal hernia.      Impression:         1. Moderate bilateral pleural effusions, right greater than left with bibasilar atelectasis or infiltrates. Smaller patchy infiltrates are seen in the upper lobes bilaterally, left greater than right.  2.  Emphysema.  3. Coronary artery calcification. Recommend clinical correlation for coronary artery disease.    Signer Name: Arnoldo Bo MD   Signed: 2/29/2020 9:57 PM   Workstation Name: [id number]    [facility]    XR Chest 1 View [[id number]] Collected:  02/29/20 1233     Updated:  02/29/20 1236    Narrative:       EXAMINATION: XR CHEST 1 VW-      CLINICAL INDICATION:     Chest pain     TECHNIQUE:  XR CHEST 1 VW-      COMPARISON: NONE      FINDINGS:      Bilateral interstitial thickening most consistent with edema. Underlying  chronic lung changes are noted. Patchy basilar predominant airspace  disease represents atelectasis, edema, or pneumonia.   Cardiomegaly. Pulmonary vascular congestion.   No pneumothorax.   Trace effusions.   No acute osseous findings.          Impression:       1. CHF/edema superimposed on chronic changes.  2. Basilar airspace disease.     This report was finalized on 2/29/2020 12:34 PM by Dr. Arnoldo Scott MD.             I personally viewed and interpreted the patient's EKG/Telemetry data.    Assessment:     CHF- Likely ischemic and chronic, no records, no good history, apparently prior stents, will start Entresto and also low dose Coreg, plus Torsemide, he might not be a candidate for aggressive evaluation due to his age and mild confusion.        Plan:      1- Start Entresto and Coreg , plus Torsemide       Philip Forrest MD  03/01/20  10:56 AM

## 2020-03-01 NOTE — PROGRESS NOTES
"    [facility] Medicine Services  CROSS COVER NOTE    Contacted by YUSUF Terrazas stating that the patient was having severe SOA, and saying that he is \"going to die because he cannot breathe.\"  Patient's heart rate 109, respirations 20-22, O2 saturation 94% on 2 L nasal cannula, /98.  Upon evaluation at bedside patient was tachypneic and complaining of shortness of breath and diaphoresis.  He denied any chest pain, chills, nausea, wheezing.  Upon auscultation, the patient had no wheezing, rales, rhonchi, however, there was decreased air movement.  It was elected to give the patient an Atrovent treatment which brought his O2 saturation to 97 to 99% during the treatment.  Once the treatment was stopped, the patient's O2 saturation dropped down to 91 to 93%.  In addition, the patient started having some nausea with phlegm production at this time.  Patient's O2 was increased to 3 L per respiratory therapist and it was elected to give the patient 20 mg of IV Solu-Medrol and 12.5 mg of IV Phenergan.  In addition, stat EKG, troponin, chest x-ray, and ABG was ordered.  After giving the IV Solu-Medrol 20 mg x 1 and increasing in the patient's oxygen, O2 saturation around 95 to 96%.  HR now 103. Patient currently is lying in bed, resting.  Reports that his breathing feels somewhat better.    Troponin resulted 0.056.  Previous troponin 0.055.  Patient has a resting tremor, therefore, EKG was indeterminate.     ABG:        Alena Pinzon PA-C  Hospitalist Service -- [facility]   Pager: [phone]    02/29/20  11:38 PM      "

## 2020-03-01 NOTE — PROGRESS NOTES
[facility]  PROGRESS NOTE     Patient Identification:  Name:  Rafael Hussein  Age:  89 y.o.  Sex:  male  :  1923  MRN:  [id number]  Visit Number:  [id number]  Primary Care Provider:  Rio Hardin APRN    Length of stay:  1    ----------------------------------------------------------------------------------------------------------------------  Subjective     Chief Complaint:  Follow up for acute hypoxic respiratory failure, sepsis, pneumonia, CHF exacerbation, NSTEMI    History of Presenting Illness/Hospital Course:  Patient is a 89 yo male with past medical history significant for emphysema, AAA s/p repair, 's pneumoconiosis, and CAD that presented to [facility] ED with complaint of shortness of breath and chest discomfort. Patient was admitted to the telemetry floor with acute hypoxic respiratory failure, sepsis 2/2 bibasilar community acquired pneumonia and acute CHF exacerbation.     Subjective:  Today, the patient was lying in bed, resting comfortably upon my evaluation. Patient's daughter present at bedside. Patient on room air, tolerating well. Patient states he feels much better today, dyspnea has improved. Cough improved. Denies any complaints at time of my eval. Denies any fevers or chills. Denies any chest pain. Denies any abdominal pain, nausea, vomiting or diarrhea. No urinary symptoms.     It is of note that overnight patient did have episode of increased dyspnea. He was administered neb tx and IV solumedrol 20 mg, see chart. Repeat CXR with worsening pulmonary edema.     Present during exam: Patient's daughter   ----------------------------------------------------------------------------------------------------------------------  Objective     Consults:  • Cardiology     Procedures:  • 3/1/2020: Transthoracic echocardiogram   · The left ventricular cavity is moderate-to-severely dilated.  · Left ventricular mass  index is increased.  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  · Left ventricular systolic function is severely decreased.  · Left ventricular diastolic (grade III) consistent with fixed restricive pattern.  · Left atrial cavity size is severely dilated.  · Moderate aortic valve regurgitation is present.  · Moderate mitral valve regurgitation is present  · The right atrium is also dilated  · The E/E~ is over 40 indicating increase left atrium pressure    Current Hospital Meds:    aspirin 81 mg Oral Daily   atorvastatin 40 mg Oral Nightly   cefTRIAXone 1 g Intravenous Q24H   doxycycline 100 mg Intravenous Q12H   famotidine 20 mg Oral Daily   furosemide 40 mg Intravenous Once   guaiFENesin 600 mg Oral Q12H   heparin (porcine) 5,000 Units Subcutaneous Q8H   ipratropium 0.5 mg Nebulization Q6H   multivitamin 1 tablet Oral Daily   promethazine      sodium chloride 10 mL Intravenous Q12H        ----------------------------------------------------------------------------------------------------------------------  Vital Signs:  Temp:  [97.3 °F (36.3 °C)-98.4 °F (36.9 °C)] 98.2 °F (36.8 °C)  Heart Rate:  [] 93  Resp:  [16-24] 20  BP: ()/(41-98) 125/89  Mean Arterial Pressure (Non-Invasive) for the past 24 hrs (Last 3 readings):   Noninvasive MAP (mmHg)   02/29/20 1634 86   02/29/20 1612 68   02/29/20 1602 76     SpO2 Percentage    03/01/20 0643 03/01/20 0653 03/01/20 0700   SpO2: 99% 98% 96%     SpO2:  [85 %-100 %] 96 %  on  Flow (L/min):  [2] 2;   Device (Oxygen Therapy): nasal cannula    Body mass index is 21.94 kg/m².  Wt Readings from Last 3 Encounters:   03/01/20 65.5 kg (144 lb 4.8 oz)        Intake/Output Summary (Last 24 hours) at 3/1/2020 0805  Last data filed at 3/1/2020 0243  Gross per 24 hour   Intake 2706 ml   Output 1450 ml   Net 1256 ml     Diet Regular  ----------------------------------------------------------------------------------------------------------------------  Physical  exam:  Physical Exam   Constitutional: He is oriented to person, place, and time. He appears well-developed and well-nourished. He is cooperative.  Non-toxic appearance. He appears ill (Chronically ). No distress.   Elderly, pleasant, no acute distress noted, thin, daughter at bedside. On room air.    HENT:   Head: Normocephalic and atraumatic.   Mouth/Throat: Mucous membranes are normal.   Eyes: Pupils are equal, round, and reactive to light. Conjunctivae are normal. No scleral icterus.   Neck: Neck supple.   Cardiovascular: Normal rate, regular rhythm, normal heart sounds and intact distal pulses. Exam reveals no gallop and no friction rub.   No murmur heard.  Pulses:       Dorsalis pedis pulses are 2+ on the right side, and 2+ on the left side.        Posterior tibial pulses are 2+ on the right side, and 2+ on the left side.   Pulmonary/Chest: Effort normal. No accessory muscle usage. No tachypnea. No respiratory distress. He has no wheezes. He has no rhonchi. He has rales in the right lower field and the left lower field. He exhibits no tenderness.   On room air   Abdominal: Soft. Bowel sounds are normal. He exhibits no distension. There is no tenderness. There is no rebound and no guarding.   Genitourinary:   Genitourinary Comments: No ochoa catheter in place, making urine.   Musculoskeletal: He exhibits no tenderness or deformity.        Right lower leg: He exhibits edema (trace).        Left lower leg: He exhibits edema (trace).   Neurological: He is alert and oriented to person, place, and time. He displays atrophy (global, age related). No cranial nerve deficit or sensory deficit.   Awake and alert. Follows commands. No focal neuro deficits on exam. Moves all extremities.   Skin: Skin is warm and dry. Capillary refill takes less than 2 seconds. No rash noted. No erythema. No pallor.   Psychiatric: He has a normal mood and affect. His speech is normal and behavior is normal. Judgment and thought content  normal. He exhibits abnormal recent memory.   Nursing note and vitals reviewed.     ----------------------------------------------------------------------------------------------------------------------  Tele:    Patient refusing tele at this time    I have personally reviewed the EKG/Telemetry strips   ----------------------------------------------------------------------------------------------------------------------  Results from last 7 days   Lab Units 03/01/20 0136 02/29/20  2243 02/29/20  1718   TROPONIN T ng/mL 0.063* 0.056* 0.055*     Results from last 7 days   Lab Units 03/01/20  0136 02/29/20  0845   PROBNP pg/mL 7,741.0* 5,965.0*       Results from last 7 days   Lab Units 02/29/20  2330   PH, ARTERIAL pH units 7.346*   PO2 ART mm Hg 69.6*   PCO2, ARTERIAL mm Hg 41.4   HCO3 ART mmol/L 22.6     Results from last 7 days   Lab Units 03/01/20  0136 02/29/20  1237 02/29/20  0845   LACTATE mmol/L  --  2.4* 3.3*   WBC 10*3/mm3 16.09*  --  12.35*   HEMOGLOBIN g/dL 12.1*  --  11.7*   HEMATOCRIT % 40.6  --  39.1   MCV fL 94.0  --  94.2   MCHC g/dL 29.8*  --  29.9*   PLATELETS 10*3/mm3 213  --  182     Results from last 7 days   Lab Units 03/01/20  0136 02/29/20  1718 02/29/20  0845   SODIUM mmol/L 144  --  141   POTASSIUM mmol/L 4.3  --  3.7   MAGNESIUM mg/dL 2.0 2.0  --    CHLORIDE mmol/L 107  --  105   CO2 mmol/L 23.2  --  22.0   BUN mg/dL 17  --  18   CREATININE mg/dL 0.92  --  0.96   EGFR IF NONAFRICN AM mL/min/1.73 76  --  73   CALCIUM mg/dL 8.8  --  8.6   GLUCOSE mg/dL 154*  --  187*   ALBUMIN g/dL  --   --  3.21*   BILIRUBIN mg/dL  --   --  0.4   ALK PHOS U/L  --   --  90   AST (SGOT) U/L  --   --  22   ALT (SGPT) U/L  --   --  18   Estimated Creatinine Clearance: 43.5 mL/min (by C-G formula based on SCr of 0.92 mg/dL).    Lab Results   Component Value Date    HGBA1C 5.80 (H) 02/29/2020     Lab Results   Component Value Date    TSH 2.400 03/01/2020     Microbiology Results (last 10 days)     Procedure  Component Value - Date/Time    Mycoplasma Pneumoniae Antibody, IgM - Blood, Arm, Left [[id number]]  (Normal) Collected:  02/29/20 1718    Lab Status:  Final result Specimen:  Blood from Arm, Left Updated:  02/29/20 1816     Mycoplasma pneumo IgM Negative    Respiratory Panel, PCR - Swab, Nasopharynx [[id number]]  (Normal) Collected:  02/29/20 1712    Lab Status:  Final result Specimen:  Swab from Nasopharynx Updated:  02/29/20 1839     ADENOVIRUS, PCR Not Detected     Coronavirus 229E Not Detected     Coronavirus HKU1 Not Detected     Coronavirus NL63 Not Detected     Coronavirus OC43 Not Detected     Human Metapneumovirus Not Detected     Human Rhinovirus/Enterovirus Not Detected     Influenza B PCR Not Detected     Parainfluenza Virus 1 Not Detected     Parainfluenza Virus 2 Not Detected     Parainfluenza Virus 3 Not Detected     Parainfluenza Virus 4 Not Detected     Bordetella pertussis pcr Not Detected     Influenza A H1 2009 PCR Not Detected     Chlamydophila pneumoniae PCR Not Detected     Mycoplasma pneumo by PCR Not Detected     Influenza A PCR Not Detected     Influenza A H3 Not Detected     Influenza A H1 Not Detected     RSV, PCR Not Detected    Blood Culture - Blood, Arm, Right [[id number]] Collected:  02/29/20 0910    Lab Status:  Preliminary result Specimen:  Blood from Arm, Right Updated:  02/29/20 2230     Blood Culture No growth at less than 24 hours    Blood Culture - Blood, Arm, Right [[id number]] Collected:  02/29/20 0845    Lab Status:  Preliminary result Specimen:  Blood from Arm, Right Updated:  02/29/20 2230     Blood Culture No growth at less than 24 hours         I have personally reviewed the above laboratory results.   ----------------------------------------------------------------------------------------------------------------------  Imaging Results (Last 24 Hours)     Procedure Component Value Units Date/Time    XR Chest 1 View [[id number]] Collected:  02/29/20 2358     Updated:  03/01/20  0000    Narrative:       CR Chest 1 Vw    INDICATION:   Shortness of air today     COMPARISON:    Chest CT 2/29/2020    FINDINGS:  Single portable AP view(s) of the chest.        Heart size is normal. Mild interstitial prominence is present. There are no focal infiltrates.       Impression:       Mild diffuse interstitial prominence without focal infiltrates    Signer Name: Manoj Bragg MD   Signed: 2/29/2020 11:58 PM   Workstation Name: [facility]    [facility]    CT Chest Without Contrast [[id number]] Collected:  02/29/20 2157     Updated:  02/29/20 2159    Narrative:       CT scan of the chest without contrast 2/29/2020    INDICATION:  Set shortness of breath and cough today. Lobar pneumonia, respiratory failure with hypoxia and sepsis.    TECHNIQUE:   CT of the thorax without contrast. Coronal and sagittal reconstructions were obtained.  Radiation dose reduction techniques included automated exposure control or exposure modulation based on body size. Radiation audit for number of CT and nuclear  cardiology exams performed in the last year: 0.      COMPARISON:   None available.    FINDINGS:  There is diffuse centrilobular emphysema. Moderate bilateral pleural effusions, right greater than left with bibasilar atelectasis or infiltrates. Smaller multifocal patchy infiltrates are seen in the upper lobes bilaterally, left greater than right.  There is no significant thoracic lymphadenopathy. The heart is normal in size. There is coronary artery calcification. Recommend clinical correlation for coronary artery disease. Small hiatal hernia.      Impression:         1. Moderate bilateral pleural effusions, right greater than left with bibasilar atelectasis or infiltrates. Smaller patchy infiltrates are seen in the upper lobes bilaterally, left greater than right.  2. Emphysema.  3. Coronary artery calcification. Recommend clinical correlation for coronary artery disease.    Signer Name: Arnoldo  MD Anupam   Signed: 2/29/2020 9:57 PM   Workstation Name: [id number]    [facility]    XR Chest 1 View [[id number]] Collected:  02/29/20 1233     Updated:  02/29/20 1236    Narrative:       EXAMINATION: XR CHEST 1 VW-      CLINICAL INDICATION:     Chest pain     TECHNIQUE:  XR CHEST 1 VW-      COMPARISON: NONE      FINDINGS:      Bilateral interstitial thickening most consistent with edema. Underlying  chronic lung changes are noted. Patchy basilar predominant airspace  disease represents atelectasis, edema, or pneumonia.   Cardiomegaly. Pulmonary vascular congestion.   No pneumothorax.   Trace effusions.   No acute osseous findings.          Impression:       1. CHF/edema superimposed on chronic changes.  2. Basilar airspace disease.     This report was finalized on 2/29/2020 12:34 PM by Dr. Arnoldo Scott MD.           I have personally reviewed the above radiology results.   ----------------------------------------------------------------------------------------------------------------------  Assessment/Plan     · Acute CHF exacerbation, likely ischemic cardiomyopathy with grade III diastolic dysfunction: Patient with increased BNP overnight. ECHO obtained revealing sig decreased EF and grade III diastolic dysfunction. Pt with episode of increased SOA overnight, repeat CXR with worsening CHF changes/pulmonary edema. IV lasix 40 mg IV administered this AM. Cardiology has evaluated the patient and started patient on torsemide, Coreg, and Entresto. Appreciate cardiology input/assistance. Closely monitor BP to prevent hypotension, urine output, and renal function with diuresis. Strict Is and Os, daily weights.   · Sepsis, present on admission, secondary to bibasilar community acquired pneumonia: Blood cultures with NGTD. Respiratory panel PCR negative. Continue empiric coverage with IV rocephin, discontinue IV doxycyline with negative atypical work up. Scheduled inhalants. Monitor cultures  for final results. His vitals have stabilized, no longer hypotensive. Afebrile. Monitor closely. WBC remains elevated but did receive a dose of IV solumedrol 20 mg overnight. No wheezing on exam today. Repeat CBC in AM.   · Acute hypoxic respiratory failure: Patient tolerating room air, monitor closely on pulse oximetry. Supplemental O2 as necessary to titrate SpO2 > 90%. Treatment of pneumonia and CHF as outlined above.  · Troponin elevation in setting of history of CAD, with reported stenting in the past: Flat trend. Cardiology on board. Patient without any further chest pain. Continues on medical management. Cardiology has added Entresto, Coreg, and Torsemide. Continue with daily aspirin and statin.   · LBBB of unknown chronicity: Cardiology on board, likely due to previous ischemia. No recent EKG to compare, medical records ordered from cardiologist and PCP. Monitor on tele.   · Normocytic anemia: Hemoglobin stable, no active bleeding, unknown hgb baseline. Monitor closely, transfuse if necessary. Repeat CBC in AM.   · 's pneumoconiosis and emphysema: No acute exacerbation. No wheezing on exam. Monitor closely, nebs on board, Supp O2 as needed.  · History of AAA, s/p repair in past: Not consistent with hx of dissection. Continue to monitor.   · Hypoalbuminemia: Likely multifactorial, closely monitor and repeat chem panel in AM.  · Malnutrition: Nutrition consult  · Advanced age with related debility: PT/OT consult     --------------------------------------------------  DVT Prophylaxis: SQ heparin   GI Prophylaxis: Pepcid   FEN: No IV fluids. Replace electrolytes per protocol as necessary. Cardiac diet.  Activity: Up with assistance   Home O2: none   Disposition: Started on new cardiac meds, remains inpatient for monitoring and further treatment.  --------------------------------------------------    The patient is high risk due to the following diagnoses/reasons:  CHF exacerbation, respiratory failure,  pneumonia, sepsis, trop elevation, cardiomyopathy, hx of AAA status post repair, advanced age and debility    I have discussed the patient's assessment and plan with the patient, daughter at bedside, AM RN Estefani, and attending Dr. Hernandez.      Ketty Reddy PA-C  Hospitalist Service -- [facility]   Pager: [phone]    03/01/20  8:05 AM    Attending Physician: Karthik Hernandez MD    ----------------------------------------------------------------------------------------------------------------------

## 2020-03-02 LAB
ALBUMIN SERPL-MCNC: 3 G/DL (ref 3.5–5.2)
ALBUMIN/GLOB SERPL: 0.9 G/DL
ALP SERPL-CCNC: 86 U/L (ref 39–117)
ALT SERPL W P-5'-P-CCNC: 23 U/L (ref 1–41)
ANION GAP SERPL CALCULATED.3IONS-SCNC: 11.4 MMOL/L (ref 5–15)
AST SERPL-CCNC: 31 U/L (ref 1–40)
BASOPHILS # BLD AUTO: 0.07 10*3/MM3 (ref 0–0.2)
BASOPHILS NFR BLD AUTO: 0.4 % (ref 0–1.5)
BILIRUB SERPL-MCNC: 0.4 MG/DL (ref 0.2–1.2)
BUN BLD-MCNC: 27 MG/DL (ref 8–23)
BUN/CREAT SERPL: 23.1 (ref 7–25)
CALCIUM SPEC-SCNC: 8.4 MG/DL (ref 8.2–9.6)
CHLORIDE SERPL-SCNC: 103 MMOL/L (ref 98–107)
CO2 SERPL-SCNC: 24.6 MMOL/L (ref 22–29)
CREAT BLD-MCNC: 1.17 MG/DL (ref 0.76–1.27)
CRP SERPL-MCNC: 3.58 MG/DL (ref 0–0.5)
DEPRECATED RDW RBC AUTO: 48.3 FL (ref 37–54)
EOSINOPHIL # BLD AUTO: 0.02 10*3/MM3 (ref 0–0.4)
EOSINOPHIL NFR BLD AUTO: 0.1 % (ref 0.3–6.2)
ERYTHROCYTE [DISTWIDTH] IN BLOOD BY AUTOMATED COUNT: 14.6 % (ref 12.3–15.4)
GFR SERPL CREATININE-BSD FRML MDRD: 58 ML/MIN/1.73
GLOBULIN UR ELPH-MCNC: 3.3 GM/DL
GLUCOSE BLD-MCNC: 110 MG/DL (ref 65–99)
HCT VFR BLD AUTO: 41.1 % (ref 37.5–51)
HGB BLD-MCNC: 12.9 G/DL (ref 13–17.7)
IMM GRANULOCYTES # BLD AUTO: 0.13 10*3/MM3 (ref 0–0.05)
IMM GRANULOCYTES NFR BLD AUTO: 0.7 % (ref 0–0.5)
LYMPHOCYTES # BLD AUTO: 1.67 10*3/MM3 (ref 0.7–3.1)
LYMPHOCYTES NFR BLD AUTO: 9.6 % (ref 19.6–45.3)
MAGNESIUM SERPL-MCNC: 2.1 MG/DL (ref 1.7–2.3)
MCH RBC QN AUTO: 28.3 PG (ref 26.6–33)
MCHC RBC AUTO-ENTMCNC: 31.4 G/DL (ref 31.5–35.7)
MCV RBC AUTO: 90.1 FL (ref 79–97)
MONOCYTES # BLD AUTO: 2.08 10*3/MM3 (ref 0.1–0.9)
MONOCYTES NFR BLD AUTO: 11.9 % (ref 5–12)
NEUTROPHILS # BLD AUTO: 13.44 10*3/MM3 (ref 1.7–7)
NEUTROPHILS NFR BLD AUTO: 77.3 % (ref 42.7–76)
NRBC BLD AUTO-RTO: 0 /100 WBC (ref 0–0.2)
NT-PROBNP SERPL-MCNC: ABNORMAL PG/ML (ref 5–1800)
PLATELET # BLD AUTO: 236 10*3/MM3 (ref 140–450)
PMV BLD AUTO: 9.8 FL (ref 6–12)
POTASSIUM BLD-SCNC: 3.3 MMOL/L (ref 3.5–5.2)
PROT SERPL-MCNC: 6.3 G/DL (ref 6–8.5)
RBC # BLD AUTO: 4.56 10*6/MM3 (ref 4.14–5.8)
SODIUM BLD-SCNC: 139 MMOL/L (ref 136–145)
WBC NRBC COR # BLD: 17.41 10*3/MM3 (ref 3.4–10.8)

## 2020-03-02 PROCEDURE — 85025 COMPLETE CBC W/AUTO DIFF WBC: CPT | Performed by: PHYSICIAN ASSISTANT

## 2020-03-02 PROCEDURE — 86140 C-REACTIVE PROTEIN: CPT | Performed by: PHYSICIAN ASSISTANT

## 2020-03-02 PROCEDURE — 83735 ASSAY OF MAGNESIUM: CPT | Performed by: PHYSICIAN ASSISTANT

## 2020-03-02 PROCEDURE — 25010000002 HEPARIN (PORCINE) PER 1000 UNITS: Performed by: INTERNAL MEDICINE

## 2020-03-02 PROCEDURE — 99233 SBSQ HOSP IP/OBS HIGH 50: CPT | Performed by: PHYSICIAN ASSISTANT

## 2020-03-02 PROCEDURE — 99232 SBSQ HOSP IP/OBS MODERATE 35: CPT | Performed by: SPECIALIST

## 2020-03-02 PROCEDURE — 94799 UNLISTED PULMONARY SVC/PX: CPT

## 2020-03-02 PROCEDURE — 80053 COMPREHEN METABOLIC PANEL: CPT | Performed by: PHYSICIAN ASSISTANT

## 2020-03-02 PROCEDURE — 25010000002 CEFTRIAXONE: Performed by: PHYSICIAN ASSISTANT

## 2020-03-02 PROCEDURE — 83880 ASSAY OF NATRIURETIC PEPTIDE: CPT | Performed by: PHYSICIAN ASSISTANT

## 2020-03-02 RX ORDER — POTASSIUM CHLORIDE 20 MEQ/1
20 TABLET, EXTENDED RELEASE ORAL ONCE
Status: COMPLETED | OUTPATIENT
Start: 2020-03-02 | End: 2020-03-02

## 2020-03-02 RX ORDER — L.ACID,PARA/B.BIFIDUM/S.THERM 8B CELL
1 CAPSULE ORAL DAILY
Status: DISCONTINUED | OUTPATIENT
Start: 2020-03-02 | End: 2020-03-05 | Stop reason: HOSPADM

## 2020-03-02 RX ADMIN — ATORVASTATIN CALCIUM 40 MG: 40 TABLET, FILM COATED ORAL at 19:48

## 2020-03-02 RX ADMIN — SODIUM CHLORIDE, PRESERVATIVE FREE 10 ML: 5 INJECTION INTRAVENOUS at 19:48

## 2020-03-02 RX ADMIN — Medication 1 TABLET: at 07:51

## 2020-03-02 RX ADMIN — SACUBITRIL AND VALSARTAN 1 TABLET: 24; 26 TABLET, FILM COATED ORAL at 19:56

## 2020-03-02 RX ADMIN — HEPARIN SODIUM 5000 UNITS: 5000 INJECTION INTRAVENOUS; SUBCUTANEOUS at 19:47

## 2020-03-02 RX ADMIN — IPRATROPIUM BROMIDE 0.5 MG: 0.5 SOLUTION RESPIRATORY (INHALATION) at 07:08

## 2020-03-02 RX ADMIN — CEFTRIAXONE 1 G: 1 INJECTION, POWDER, FOR SOLUTION INTRAMUSCULAR; INTRAVENOUS at 19:47

## 2020-03-02 RX ADMIN — FAMOTIDINE 20 MG: 20 TABLET, FILM COATED ORAL at 07:51

## 2020-03-02 RX ADMIN — Medication 1 CAPSULE: at 18:13

## 2020-03-02 RX ADMIN — POTASSIUM CHLORIDE 20 MEQ: 20 TABLET, EXTENDED RELEASE ORAL at 12:53

## 2020-03-02 RX ADMIN — ASPIRIN 81 MG: 81 TABLET, CHEWABLE ORAL at 07:50

## 2020-03-02 RX ADMIN — HYDROXYZINE HYDROCHLORIDE 25 MG: 25 TABLET, FILM COATED ORAL at 07:51

## 2020-03-02 RX ADMIN — SODIUM CHLORIDE, PRESERVATIVE FREE 10 ML: 5 INJECTION INTRAVENOUS at 07:51

## 2020-03-02 RX ADMIN — IPRATROPIUM BROMIDE 0.5 MG: 0.5 SOLUTION RESPIRATORY (INHALATION) at 00:27

## 2020-03-02 RX ADMIN — SODIUM CHLORIDE 500 ML: 9 INJECTION, SOLUTION INTRAVENOUS at 19:58

## 2020-03-02 RX ADMIN — GUAIFENESIN 600 MG: 600 TABLET, EXTENDED RELEASE ORAL at 07:50

## 2020-03-02 RX ADMIN — TORSEMIDE 20 MG: 20 TABLET ORAL at 07:50

## 2020-03-02 RX ADMIN — SACUBITRIL AND VALSARTAN 1 TABLET: 24; 26 TABLET, FILM COATED ORAL at 07:50

## 2020-03-02 RX ADMIN — CARVEDILOL 3.12 MG: 3.12 TABLET, FILM COATED ORAL at 18:13

## 2020-03-02 RX ADMIN — GUAIFENESIN 600 MG: 600 TABLET, EXTENDED RELEASE ORAL at 19:48

## 2020-03-02 RX ADMIN — CARVEDILOL 3.12 MG: 3.12 TABLET, FILM COATED ORAL at 07:49

## 2020-03-02 RX ADMIN — HEPARIN SODIUM 5000 UNITS: 5000 INJECTION INTRAVENOUS; SUBCUTANEOUS at 18:13

## 2020-03-02 NOTE — PROGRESS NOTES
Patient Identification:  Name:  Rafael Hussein  Age:  89 y.o.  Sex:  male  :  1923  MRN:  [id number]  Visit Number:  [id number]    Chief Complaint:   Acute on chronic combined systolic and diastolic heart failure heart failure, flat trend elevated troponin, coronary artery disease    Subjective:    Patient denies any chest pain he feels better this morning he shortness of breath has much improved  ----------------------------------------------------------------------------------------------------------------------  Current Hospital Meds:    aspirin 81 mg Oral Daily   atorvastatin 40 mg Oral Nightly   carvedilol 3.125 mg Oral BID With Meals   cefTRIAXone 1 g Intravenous Q24H   famotidine 20 mg Oral Daily   guaiFENesin 600 mg Oral Q12H   heparin (porcine) 5,000 Units Subcutaneous Q8H   ipratropium 0.5 mg Nebulization Q6H   multivitamin 1 tablet Oral Daily   potassium chloride 20 mEq Oral Once   sacubitril-valsartan 1 tablet Oral Q12H   sodium chloride 10 mL Intravenous Q12H   torsemide 20 mg Oral Daily        ----------------------------------------------------------------------------------------------------------------------  Vital Signs:  Temp:  [97.9 °F (36.6 °C)-98.5 °F (36.9 °C)] 98.2 °F (36.8 °C)  Heart Rate:  [67-94] 76  Resp:  [17-18] 18  BP: ()/(52-68) 111/58  Vital Signs (last 72 hrs)        07  -   0659  07  -   0659  07  -   0659  07  -   1115   Most Recent    Temp (°F)   97.3 -  98.4    97.9 -  98.5       98.2 (36.8)    Heart Rate   63 -  111    67 -  94    74 -  76     76    Resp   16 -  24    17 -  20      18     18    BP   70/53 -  178/98    98/55 -  129/68       111/58    SpO2 (%)   85 -  100    92 -  98      98     98            20  1634 20  0500 20  0500   Weight: 65.4 kg (144 lb 3.2 oz) 65.5 kg (144 lb 4.8 oz) 64.9 kg (143 lb)     Body mass index is 21.74 kg/m².    Intake/Output Summary (Last 24 hours) at 3/2/2020  1115  Last data filed at 3/2/2020 0900  Gross per 24 hour   Intake 560 ml   Output 1950 ml   Net -1390 ml     Diet Regular; Daily Fluid Restriction; 1500 mL Fluid Per Day  ----------------------------------------------------------------------------------------------------------------------  Physical exam:    HEENT:  Head:  Normocephalic and atraumatic.     Eyes:  Conjunctivae and EOM are normal.  Pupils are equal, round, and reactive to light.  No scleral icterus.    Neck:  Neck supple.  No JVD present.  No bruit.  Cardiovascular: Normal rate, regular rhythm, S1 S2+, NO S3 / S4  Pulmonary/Chest:  Vesicular breath sounds B/L, Clear to auscultation, with no added sounds.  Abdominal:  Soft.  Bowel sounds are normal.  No distension and no tenderness.  No organomegaly.  Neurological:  Alert and oriented to person, place, and time. No focal defecits  Skin:  Skin is warm and dry. No rash noted. No pallor.   Musculoskeletal:  No tenderness, and no deformity.  No red or swollen joints anywhere.   Lower extremities: No edema, Peripheral vascular:  2+ Pulses B/L DP.  ----------------------------------------------------------------------------------------------------------------------    ----------------------------------------------------------------------------------------------------------------------  Results from last 7 days   Lab Units 03/01/20  1316 03/01/20  0725 03/01/20  0136   TROPONIN T ng/mL 0.066* 0.056* 0.063*     Results from last 7 days   Lab Units 03/02/20  0548 03/01/20  0136 02/29/20  1237 02/29/20  0845   CRP mg/dL 3.58*  --   --   --    LACTATE mmol/L  --   --  2.4* 3.3*   WBC 10*3/mm3 17.41* 16.09*  --  12.35*   HEMOGLOBIN g/dL 12.9* 12.1*  --  11.7*   HEMATOCRIT % 41.1 40.6  --  39.1   MCV fL 90.1 94.0  --  94.2   MCHC g/dL 31.4* 29.8*  --  29.9*   PLATELETS 10*3/mm3 236 213  --  182     Results from last 7 days   Lab Units 02/29/20  2330   PH, ARTERIAL pH units 7.346*   PO2 ART mm Hg 69.6*   PCO2,  ARTERIAL mm Hg 41.4   HCO3 ART mmol/L 22.6     Results from last 7 days   Lab Units 03/02/20  0548 03/01/20  0136 02/29/20  1718 02/29/20  0845   SODIUM mmol/L 139 144  --  141   POTASSIUM mmol/L 3.3* 4.3  --  3.7   MAGNESIUM mg/dL 2.1 2.0 2.0  --    CHLORIDE mmol/L 103 107  --  105   CO2 mmol/L 24.6 23.2  --  22.0   BUN mg/dL 27* 17  --  18   CREATININE mg/dL 1.17 0.92  --  0.96   EGFR IF NONAFRICN AM mL/min/1.73 58* 76  --  73   CALCIUM mg/dL 8.4 8.8  --  8.6   GLUCOSE mg/dL 110* 154*  --  187*   ALBUMIN g/dL 3.00*  --   --  3.21*   BILIRUBIN mg/dL 0.4  --   --  0.4   ALK PHOS U/L 86  --   --  90   AST (SGOT) U/L 31  --   --  22   ALT (SGPT) U/L 23  --   --  18   Estimated Creatinine Clearance: 33.9 mL/min (by C-G formula based on SCr of 1.17 mg/dL).    No results found for: AMMONIA      Blood Culture   Date Value Ref Range Status   02/29/2020 No growth at 2 days  Preliminary   02/29/2020 No growth at 2 days  Preliminary     No results found for: URINECX  No results found for: WOUNDCX  No results found for: STOOLCX    I have personally looked at the labs and they are summarized above.  ----------------------------------------------------------------------------------------------------------------------  Imaging Results (Last 24 Hours)     ** No results found for the last 24 hours. **        ----------------------------------------------------------------------------------------------------------------------    Assessment:  1.  Severe ischemic cardiomyopathy EF is 20% illness  2.  Coronary artery disease with flat trend elevated troponin  3.  Advanced age  4.  Acute on chronic combined systolic and diastolic heart failure    Plan:  1.  Patient has been diuresing well we will continue with the current management  2.  His flatly elevated troponin most likely related to CHF rather than acute coronary syndrome  3.  His blood pressure is rather labile will monitor his blood pressure for now would not advise other  medications this point      Cece Escobedo MD   03/02/20 11:15 AM

## 2020-03-02 NOTE — NURSING NOTE
Called patient family to inform them that patient is trying is very confused and repeatedly attempting to get out of the bed as well as being combative to staff when being assisted. Bed alarm is on bed is in lowest position.

## 2020-03-02 NOTE — PROGRESS NOTES
[facility] Medicine Services  PROGRESS NOTE     Patient Identification:  Name:  Rafael Hussein  Age:  89 y.o.  Sex:  male  :  1923  MRN:  [id number]  Visit Number:  [id number]  Primary Care Provider:  Rio Hardin APRN    Length of stay:  2    ----------------------------------------------------------------------------------------------------------------------  Subjective     Chief Complaint:  Follow up for acute hypoxic respiratory failure, sepsis, pneumonia, CHF exacerbation, NSTEMI    History of Presenting Illness/Hospital Course:  Patient is a 89 yo male with past medical history significant for emphysema, AAA s/p repair, 's pneumoconiosis, and CAD that presented to [facility] ED with complaint of shortness of breath and chest discomfort. Patient was admitted to the telemetry floor with acute hypoxic respiratory failure, sepsis 2/2 bibasilar community acquired pneumonia and acute CHF exacerbation.     Subjective:  Today, the patient was sitting up in bed resting upon my evaluation this afternoon, patient's daughter present at bedside.  Patient states he feels well, is of no complaints.  Patient noted be intermittently confused, daughter states that he does not get too confused at home but is hard of hearing.  Patient denies any dyspnea or chest pain today.  No fevers or chills.  Cough improved.  Denies any abdominal pain, nausea, vomiting or diarrhea.  No urinary symptoms reported.  Patient's daughter did bring the patient in his pajamas pants and house shoes as well as his cane, wishes for him to be ambulated today.     Present during exam: Patient's daughter   ----------------------------------------------------------------------------------------------------------------------  Objective     Consults:  • Cardiology     Procedures:  • 3/1/2020: Transthoracic echocardiogram   · The left ventricular cavity is moderate-to-severely dilated.  · Left  ventricular mass index is increased.  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  · Left ventricular systolic function is severely decreased.  · Left ventricular diastolic (grade III) consistent with fixed restricive pattern.  · Left atrial cavity size is severely dilated.  · Moderate aortic valve regurgitation is present.  · Moderate mitral valve regurgitation is present  · The right atrium is also dilated  · The E/E~ is over 40 indicating increase left atrium pressure    Current Hospital Meds:    aspirin 81 mg Oral Daily   atorvastatin 40 mg Oral Nightly   carvedilol 3.125 mg Oral BID With Meals   cefTRIAXone 1 g Intravenous Q24H   famotidine 20 mg Oral Daily   guaiFENesin 600 mg Oral Q12H   heparin (porcine) 5,000 Units Subcutaneous Q8H   ipratropium 0.5 mg Nebulization Q6H   multivitamin 1 tablet Oral Daily   potassium chloride 20 mEq Oral Once   sacubitril-valsartan 1 tablet Oral Q12H   sodium chloride 10 mL Intravenous Q12H   torsemide 20 mg Oral Daily        ----------------------------------------------------------------------------------------------------------------------  Vital Signs:  Temp:  [97.9 °F (36.6 °C)-98.5 °F (36.9 °C)] 98.2 °F (36.8 °C)  Heart Rate:  [67-94] 74  Resp:  [17-18] 18  BP: ()/(52-68) 111/58  Mean Arterial Pressure (Non-Invasive) for the past 24 hrs (Last 3 readings):   Noninvasive MAP (mmHg)   03/02/20 0646 78   03/01/20 1537 62   03/01/20 1126 91     SpO2 Percentage    03/02/20 0037 03/02/20 0400 03/02/20 0646   SpO2: 95% 96% 98%     SpO2:  [92 %-98 %] 98 %  on  Flow (L/min):  [2] 2;   Device (Oxygen Therapy): nasal cannula    Body mass index is 21.74 kg/m².  Wt Readings from Last 3 Encounters:   03/02/20 64.9 kg (143 lb)        Intake/Output Summary (Last 24 hours) at 3/2/2020 0842  Last data filed at 3/2/2020 0700  Gross per 24 hour   Intake 560 ml   Output 2400 ml   Net -1840 ml       Diet Regular; Daily Fluid Restriction; 1500 mL Fluid Per  Day  ----------------------------------------------------------------------------------------------------------------------  Physical exam:   Physical Exam   Constitutional: He is oriented to person, place, and time. He appears well-developed and well-nourished. He is cooperative.  Non-toxic appearance. He appears ill (Chronically ). No distress.   Elderly, pleasant, no acute distress noted, thin, daughter at bedside. On room air.    HENT:   Head: Normocephalic and atraumatic.   Mouth/Throat: Mucous membranes are normal.   Eyes: Pupils are equal, round, and reactive to light. Conjunctivae are normal. No scleral icterus.   Neck: Neck supple.   Cardiovascular: Normal rate, regular rhythm, normal heart sounds and intact distal pulses. Exam reveals no gallop and no friction rub.   No murmur heard.  Pulses:       Dorsalis pedis pulses are 2+ on the right side, and 2+ on the left side.        Posterior tibial pulses are 2+ on the right side, and 2+ on the left side.   Pulmonary/Chest: Effort normal. No accessory muscle usage. No tachypnea. No respiratory distress. He has decreased breath sounds in the right lower field and the left lower field. He has no wheezes. He has no rhonchi. He has no rales. He exhibits no tenderness.   On room air   Abdominal: Soft. Bowel sounds are normal. He exhibits no distension. There is no tenderness. There is no rebound and no guarding.   Genitourinary:   Genitourinary Comments: No ochoa catheter in place, making urine.   Musculoskeletal: He exhibits no tenderness or deformity.        Right lower leg: He exhibits edema (trace).        Left lower leg: He exhibits edema (trace).   Neurological: He is alert and oriented to person, place, and time. He displays atrophy (global, age related). No cranial nerve deficit or sensory deficit.   Awake and alert. Follows commands. No focal neuro deficits on exam. Moves all extremities.   Skin: Skin is warm and dry. Capillary refill takes less than 2  seconds. No rash noted. No erythema. No pallor.   Psychiatric: He has a normal mood and affect. His speech is normal and behavior is normal. Judgment and thought content normal. He exhibits abnormal recent memory.   Nursing note and vitals reviewed.     ----------------------------------------------------------------------------------------------------------------------  Tele:    Patient refusing tele at this time    I have personally reviewed the EKG/Telemetry strips   ----------------------------------------------------------------------------------------------------------------------  Results from last 7 days   Lab Units 03/01/20  1316 03/01/20  0725 03/01/20  0136   TROPONIN T ng/mL 0.066* 0.056* 0.063*     Results from last 7 days   Lab Units 03/02/20  0548 03/01/20  0136 02/29/20  0845   PROBNP pg/mL 16,421.0* 7,741.0* 5,965.0*       Results from last 7 days   Lab Units 02/29/20  2330   PH, ARTERIAL pH units 7.346*   PO2 ART mm Hg 69.6*   PCO2, ARTERIAL mm Hg 41.4   HCO3 ART mmol/L 22.6     Results from last 7 days   Lab Units 03/02/20  0548 03/01/20  0136 02/29/20  1237 02/29/20  0845   LACTATE mmol/L  --   --  2.4* 3.3*   WBC 10*3/mm3 17.41* 16.09*  --  12.35*   HEMOGLOBIN g/dL 12.9* 12.1*  --  11.7*   HEMATOCRIT % 41.1 40.6  --  39.1   MCV fL 90.1 94.0  --  94.2   MCHC g/dL 31.4* 29.8*  --  29.9*   PLATELETS 10*3/mm3 236 213  --  182     Results from last 7 days   Lab Units 03/02/20  0548 03/01/20  0136 02/29/20  1718 02/29/20  0845   SODIUM mmol/L 139 144  --  141   POTASSIUM mmol/L 3.3* 4.3  --  3.7   MAGNESIUM mg/dL 2.1 2.0 2.0  --    CHLORIDE mmol/L 103 107  --  105   CO2 mmol/L 24.6 23.2  --  22.0   BUN mg/dL 27* 17  --  18   CREATININE mg/dL 1.17 0.92  --  0.96   EGFR IF NONAFRICN AM mL/min/1.73 58* 76  --  73   CALCIUM mg/dL 8.4 8.8  --  8.6   GLUCOSE mg/dL 110* 154*  --  187*   ALBUMIN g/dL 3.00*  --   --  3.21*   BILIRUBIN mg/dL 0.4  --   --  0.4   ALK PHOS U/L 86  --   --  90   AST (SGOT) U/L 31   --   --  22   ALT (SGPT) U/L 23  --   --  18   Estimated Creatinine Clearance: 33.9 mL/min (by C-G formula based on SCr of 1.17 mg/dL).    Lab Results   Component Value Date    HGBA1C 5.80 (H) 02/29/2020     Lab Results   Component Value Date    TSH 2.400 03/01/2020     Microbiology Results (last 10 days)     Procedure Component Value - Date/Time    Mycoplasma Pneumoniae Antibody, IgM - Blood, Arm, Left [[id number]]  (Normal) Collected:  02/29/20 1718    Lab Status:  Final result Specimen:  Blood from Arm, Left Updated:  02/29/20 1816     Mycoplasma pneumo IgM Negative    Respiratory Panel, PCR - Swab, Nasopharynx [[id number]]  (Normal) Collected:  02/29/20 1712    Lab Status:  Final result Specimen:  Swab from Nasopharynx Updated:  02/29/20 1839     ADENOVIRUS, PCR Not Detected     Coronavirus 229E Not Detected     Coronavirus HKU1 Not Detected     Coronavirus NL63 Not Detected     Coronavirus OC43 Not Detected     Human Metapneumovirus Not Detected     Human Rhinovirus/Enterovirus Not Detected     Influenza B PCR Not Detected     Parainfluenza Virus 1 Not Detected     Parainfluenza Virus 2 Not Detected     Parainfluenza Virus 3 Not Detected     Parainfluenza Virus 4 Not Detected     Bordetella pertussis pcr Not Detected     Influenza A H1 2009 PCR Not Detected     Chlamydophila pneumoniae PCR Not Detected     Mycoplasma pneumo by PCR Not Detected     Influenza A PCR Not Detected     Influenza A H3 Not Detected     Influenza A H1 Not Detected     RSV, PCR Not Detected    Blood Culture - Blood, Arm, Right [[id number]] Collected:  02/29/20 0910    Lab Status:  Preliminary result Specimen:  Blood from Arm, Right Updated:  03/01/20 1030     Blood Culture No growth at 24 hours    Blood Culture - Blood, Arm, Right [[id number]] Collected:  02/29/20 0845    Lab Status:  Preliminary result Specimen:  Blood from Arm, Right Updated:  03/01/20 1030     Blood Culture No growth at 24 hours         I have personally reviewed the  above laboratory results.   ----------------------------------------------------------------------------------------------------------------------  Imaging Results (Last 24 Hours)     ** No results found for the last 24 hours. **        I have personally reviewed the above radiology results.   ----------------------------------------------------------------------------------------------------------------------  Assessment/Plan     · Acute combined diastolic/systolic CHF exacerbation, severe ischemic cardiomyopathy with grade III diastolic dysfunction: ECHO reviewed. Cardiology on board, input/assistance is appreciated. Pt started on Coreg, Entresto, and Torsemide previously, tolerating well with good diuresis. On room air. Closely monitor BP to prevent hypotension, urine output, and renal function with diuresis. Strict Is and Os, daily weights.   · Sepsis, present on admission, secondary to bibasilar community acquired pneumonia: Blood cultures with NGTD. Respiratory panel PCR negative. Continue empiric coverage with IV rocephin monotherapy. Scheduled inhalants. Monitor cultures for final results. WBC remains elevated but could be due to recent IV corticosteroid administration. Monitor CRP. Repeat CBC in AM. Vitals stable, afebrile.  · Acute hypoxic respiratory failure: Patient tolerating room air, monitor closely on pulse oximetry. Supplemental O2 as necessary to titrate SpO2 > 90%. Treatment of pneumonia and CHF as outlined above.  · Troponin elevation in setting of history of CAD, with reported stenting in the past: Flat trend. Cardiology on board. Patient without any further chest pain. Continues on medical management. Cardiology has added Entresto, Coreg, and Torsemide. Continue with daily aspirin and statin.   · Acute hypokalemia: 1 time dose of KCL ordered. Tele monitoring. Repeat chem panel in AM. Mag WNL.  · LBBB of unknown chronicity: Cardiology on board, likely due to previous ischemia. No recent EKG to  compare, medical records ordered from cardiologist and PCP. Monitor on tele.   · Normocytic anemia: Hemoglobin stable, no active bleeding, unknown hgb baseline. Monitor closely, transfuse if necessary. Repeat CBC in AM.   · 's pneumoconiosis and emphysema: No acute exacerbation. No wheezing on exam. Monitor closely, nebs on board, Supp O2 as needed.  · History of AAA, s/p repair in past: Not consistent with hx of dissection. Continue to monitor.   · Hypoalbuminemia: Likely multifactorial, closely monitor and repeat chem panel in AM.  · Malnutrition: Nutrition consult  · Advanced age with related debility: PT/OT consult in place.     --------------------------------------------------  DVT Prophylaxis: SQ heparin   GI Prophylaxis: Pepcid   FEN: No IV fluids. Replace electrolytes per protocol as necessary. Cardiac diet/fluid restriction.  Activity: Up with assistance, ambulate per shift (uses cane)  Home O2: none   Disposition: Started on new cardiac meds, remains inpatient for monitoring and further treatment.  --------------------------------------------------    The patient is high risk due to the following diagnoses/reasons:  CHF exacerbation, respiratory failure, pneumonia, sepsis, trop elevation, cardiomyopathy, hx of AAA status post repair, advanced age and debility    I have discussed the patient's assessment and plan with the patient, daughter at bedside, and nursing staff.      Ketty Reddy PA-C  Hospitalist Service -- [facility]   Pager: [phone]    03/02/20  8:42 AM    Attending Physician: Quentin Oliveros MD    ----------------------------------------------------------------------------------------------------------------------

## 2020-03-02 NOTE — PROGRESS NOTES
Discharge Planning Assessment   [place]     Patient Name: Rafael Hussein  MRN: [id number]  Today's Date: 3/2/2020    Admit Date: 2/29/2020      Discharge Plan     Row Name 03/02/20 0958       Plan    Plan  Pt admitted on 2/29/20.  SS received consult per Case Management for discharge planning.  SS noted ED Case Management completed initial assessment.  Pt lives at home with daughter, Wally, and plans to return home at discharge.  Pt currently does not utilize home health services.  Pt currently utilizes straight cane, cpap and nebulizer via unknown provider.  SS will follow.         Sharifa Cid, DANIELW

## 2020-03-02 NOTE — PHARMACY PATIENT ASSISTANCE
Pharmacy checked on the cost of Entresto for this patient. Patient has no prescription insurance coverage. We have profiled a one month free trial card for discharge, and have left a message with his daughter to discuss options.    Thank you,  Sarah Delatorre RP

## 2020-03-03 ENCOUNTER — APPOINTMENT (OUTPATIENT)
Dept: GENERAL RADIOLOGY | Facility: HOSPITAL | Age: 85
End: 2020-03-03

## 2020-03-03 LAB
ANION GAP SERPL CALCULATED.3IONS-SCNC: 13.1 MMOL/L (ref 5–15)
BASOPHILS # BLD AUTO: 0.09 10*3/MM3 (ref 0–0.2)
BASOPHILS NFR BLD AUTO: 0.7 % (ref 0–1.5)
BUN BLD-MCNC: 42 MG/DL (ref 8–23)
BUN/CREAT SERPL: 30 (ref 7–25)
CALCIUM SPEC-SCNC: 8.6 MG/DL (ref 8.2–9.6)
CHLORIDE SERPL-SCNC: 106 MMOL/L (ref 98–107)
CO2 SERPL-SCNC: 21.9 MMOL/L (ref 22–29)
CREAT BLD-MCNC: 1.4 MG/DL (ref 0.76–1.27)
CRP SERPL-MCNC: 2.65 MG/DL (ref 0–0.5)
DEPRECATED RDW RBC AUTO: 50.5 FL (ref 37–54)
EOSINOPHIL # BLD AUTO: 0.14 10*3/MM3 (ref 0–0.4)
EOSINOPHIL NFR BLD AUTO: 1.1 % (ref 0.3–6.2)
ERYTHROCYTE [DISTWIDTH] IN BLOOD BY AUTOMATED COUNT: 14.6 % (ref 12.3–15.4)
GFR SERPL CREATININE-BSD FRML MDRD: 47 ML/MIN/1.73
GLUCOSE BLD-MCNC: 105 MG/DL (ref 65–99)
HCT VFR BLD AUTO: 45.1 % (ref 37.5–51)
HGB BLD-MCNC: 13.4 G/DL (ref 13–17.7)
IMM GRANULOCYTES # BLD AUTO: 0.14 10*3/MM3 (ref 0–0.05)
IMM GRANULOCYTES NFR BLD AUTO: 1.1 % (ref 0–0.5)
LYMPHOCYTES # BLD AUTO: 1.86 10*3/MM3 (ref 0.7–3.1)
LYMPHOCYTES NFR BLD AUTO: 14 % (ref 19.6–45.3)
MAGNESIUM SERPL-MCNC: 2.2 MG/DL (ref 1.7–2.3)
MCH RBC QN AUTO: 27.7 PG (ref 26.6–33)
MCHC RBC AUTO-ENTMCNC: 29.7 G/DL (ref 31.5–35.7)
MCV RBC AUTO: 93.4 FL (ref 79–97)
MONOCYTES # BLD AUTO: 1.76 10*3/MM3 (ref 0.1–0.9)
MONOCYTES NFR BLD AUTO: 13.2 % (ref 5–12)
NEUTROPHILS # BLD AUTO: 9.34 10*3/MM3 (ref 1.7–7)
NEUTROPHILS NFR BLD AUTO: 69.9 % (ref 42.7–76)
NRBC BLD AUTO-RTO: 0 /100 WBC (ref 0–0.2)
NT-PROBNP SERPL-MCNC: 4983 PG/ML (ref 5–1800)
PHOSPHATE SERPL-MCNC: 3.9 MG/DL (ref 2.5–4.5)
PLATELET # BLD AUTO: 244 10*3/MM3 (ref 140–450)
PMV BLD AUTO: 9.6 FL (ref 6–12)
POTASSIUM BLD-SCNC: 3.8 MMOL/L (ref 3.5–5.2)
RBC # BLD AUTO: 4.83 10*6/MM3 (ref 4.14–5.8)
SODIUM BLD-SCNC: 141 MMOL/L (ref 136–145)
WBC NRBC COR # BLD: 13.33 10*3/MM3 (ref 3.4–10.8)

## 2020-03-03 PROCEDURE — 25010000002 CEFTRIAXONE: Performed by: PHYSICIAN ASSISTANT

## 2020-03-03 PROCEDURE — 97167 OT EVAL HIGH COMPLEX 60 MIN: CPT

## 2020-03-03 PROCEDURE — 83880 ASSAY OF NATRIURETIC PEPTIDE: CPT | Performed by: PHYSICIAN ASSISTANT

## 2020-03-03 PROCEDURE — 25010000002 HEPARIN (PORCINE) PER 1000 UNITS: Performed by: INTERNAL MEDICINE

## 2020-03-03 PROCEDURE — 80048 BASIC METABOLIC PNL TOTAL CA: CPT | Performed by: PHYSICIAN ASSISTANT

## 2020-03-03 PROCEDURE — 83735 ASSAY OF MAGNESIUM: CPT | Performed by: PHYSICIAN ASSISTANT

## 2020-03-03 PROCEDURE — 84100 ASSAY OF PHOSPHORUS: CPT | Performed by: PHYSICIAN ASSISTANT

## 2020-03-03 PROCEDURE — 99232 SBSQ HOSP IP/OBS MODERATE 35: CPT | Performed by: SPECIALIST

## 2020-03-03 PROCEDURE — 94799 UNLISTED PULMONARY SVC/PX: CPT

## 2020-03-03 PROCEDURE — 86140 C-REACTIVE PROTEIN: CPT | Performed by: PHYSICIAN ASSISTANT

## 2020-03-03 PROCEDURE — 99232 SBSQ HOSP IP/OBS MODERATE 35: CPT | Performed by: PHYSICIAN ASSISTANT

## 2020-03-03 PROCEDURE — 97162 PT EVAL MOD COMPLEX 30 MIN: CPT

## 2020-03-03 PROCEDURE — 85025 COMPLETE CBC W/AUTO DIFF WBC: CPT | Performed by: PHYSICIAN ASSISTANT

## 2020-03-03 PROCEDURE — 97116 GAIT TRAINING THERAPY: CPT

## 2020-03-03 PROCEDURE — 71045 X-RAY EXAM CHEST 1 VIEW: CPT

## 2020-03-03 RX ORDER — CHOLECALCIFEROL (VITAMIN D3) 125 MCG
5 CAPSULE ORAL NIGHTLY PRN
Status: DISCONTINUED | OUTPATIENT
Start: 2020-03-03 | End: 2020-03-05 | Stop reason: HOSPADM

## 2020-03-03 RX ADMIN — IPRATROPIUM BROMIDE 0.5 MG: 0.5 SOLUTION RESPIRATORY (INHALATION) at 07:15

## 2020-03-03 RX ADMIN — Medication 1 CAPSULE: at 09:53

## 2020-03-03 RX ADMIN — GUAIFENESIN 600 MG: 600 TABLET, EXTENDED RELEASE ORAL at 20:26

## 2020-03-03 RX ADMIN — GUAIFENESIN 600 MG: 600 TABLET, EXTENDED RELEASE ORAL at 09:53

## 2020-03-03 RX ADMIN — ASPIRIN 81 MG: 81 TABLET, CHEWABLE ORAL at 09:53

## 2020-03-03 RX ADMIN — FAMOTIDINE 20 MG: 20 TABLET, FILM COATED ORAL at 09:53

## 2020-03-03 RX ADMIN — CARVEDILOL 3.12 MG: 3.12 TABLET, FILM COATED ORAL at 09:53

## 2020-03-03 RX ADMIN — HEPARIN SODIUM 5000 UNITS: 5000 INJECTION INTRAVENOUS; SUBCUTANEOUS at 05:11

## 2020-03-03 RX ADMIN — Medication 1 TABLET: at 09:53

## 2020-03-03 RX ADMIN — SACUBITRIL AND VALSARTAN 1 TABLET: 24; 26 TABLET, FILM COATED ORAL at 09:53

## 2020-03-03 RX ADMIN — CEFTRIAXONE 1 G: 1 INJECTION, POWDER, FOR SOLUTION INTRAMUSCULAR; INTRAVENOUS at 20:25

## 2020-03-03 RX ADMIN — ATORVASTATIN CALCIUM 40 MG: 40 TABLET, FILM COATED ORAL at 20:26

## 2020-03-03 RX ADMIN — HEPARIN SODIUM 5000 UNITS: 5000 INJECTION INTRAVENOUS; SUBCUTANEOUS at 20:26

## 2020-03-03 NOTE — PLAN OF CARE
Patient required one-on-one care, spent the duration of shift at the nursing station.  See orders, see MAR.

## 2020-03-03 NOTE — THERAPY EVALUATION
Acute Care - Occupational Therapy Initial Evaluation   [place]     Patient Name: Rafael Hussein  : 1923  MRN: [id number]  Today's Date: 3/3/2020             Admit Date: 2020       ICD-10-CM ICD-9-CM   1. Community acquired pneumonia of right lower lobe of lung (CMS/HCC) J18.1 481   2. Acute respiratory failure with hypoxia (CMS/[facility]) J96. 518.81   3. Sepsis with acute hypoxic respiratory failure, due to unspecified organism, unspecified whether septic shock present (CMS/[facility]) A41.9 038.9    R65.20 995.91    J96. 518.81     Patient Active Problem List   Diagnosis   • Community acquired pneumonia of right lower lobe of lung (CMS/HCC)   • Normocytic anemia   • Former smoker   • Advanced age   • Hypoalbuminemia   • Lactic acidosis   • Elevated brain natriuretic peptide (BNP) level   • Elevated troponin   • LBBB (left bundle branch block)   • History of abdominal aortic aneurysm (AAA)   • Black lung (CMS/HCC)   • Macular degeneration   • Emphysema lung (CMS/HCC)     Past Medical History:   Diagnosis Date   • Aortic aneurysm (CMS/HCC)    • Black lung (CMS/HCC)    • Emphysema lung (CMS/HCC) 2020   • History of abdominal aortic aneurysm (AAA) 2020   • Macular degeneration 2020     Past Surgical History:   Procedure Laterality Date   • ABDOMINAL AORTIC ANEURYSM REPAIR     • CARDIAC CATHETERIZATION      x3           OT ASSESSMENT FLOWSHEET (last 12 hours)      Occupational Therapy Evaluation     Row Name 20 1621                   OT Evaluation Time/Intention    Document Type  evaluation  -KR        Mode of Treatment  occupational therapy  -KR        Patient Effort  poor  -KR        Comment  pt. unable to respond adequately or follow fxl commands at this time for retraining or ther ex  -KR           General Information    Patient Observations  decreased LOC;unable to respond  -KR           Cognitive Assessment/Intervention- PT/OT    Follows Commands (Cognition)  does not follow one step  commands  -KR           ADL Assessment/Intervention    BADL Assessment/Intervention  -- Dependent for all self care at this time.  -KR           General ROM    GENERAL ROM COMMENTS  unable to obtain accurate data at this time  -KR           Clinical Impression (OT)    Criteria for Skilled Therapeutic Interventions Met (OT Eval)  does not meet criteria for skilled intervention  -KR        Therapy Frequency (OT Eval)  evaluation only  -KR          User Key  (r) = Recorded By, (t) = Taken By, (c) = Cosigned By    Initials Name Effective Dates    Arnoldo Kenyon OT 04/03/18 -                OT Recommendation and Plan  Therapy Frequency (OT Eval): evaluation only           Time Calculation:     Therapy Charges for Today     Code Description Service Date Service Provider Modifiers Qty    [id number]  OT EVAL HIGH COMPLEXITY 2 3/3/2020 Arnoldo Estrada OT GO 1               Arnoldo Estrada OT  3/3/2020

## 2020-03-03 NOTE — PROGRESS NOTES
Patient Identification:  Name:  Rafael Hussein  Age:  89 y.o.  Sex:  male  :  1923  MRN:  [id number]  Visit Number:  [id number]    Chief Complaint:   Acute on chronic combined systolic and diastolic heart failure, flat trend elevated troponin, coronary artery disease    Subjective:    Patient is denying any complaint he denied chest pain no shortness of breath  ----------------------------------------------------------------------------------------------------------------------  Current Hospital Meds:    aspirin 81 mg Oral Daily   atorvastatin 40 mg Oral Nightly   carvedilol 3.125 mg Oral BID With Meals   cefTRIAXone 1 g Intravenous Q24H   famotidine 20 mg Oral Daily   guaiFENesin 600 mg Oral Q12H   heparin (porcine) 5,000 Units Subcutaneous Q8H   ipratropium 0.5 mg Nebulization Q6H   lactobacillus acidophilus 1 capsule Oral Daily   multivitamin 1 tablet Oral Daily   sacubitril-valsartan 1 tablet Oral Q12H   sodium chloride 10 mL Intravenous Q12H        ----------------------------------------------------------------------------------------------------------------------  Vital Signs:  Temp:  [97.3 °F (36.3 °C)-98.3 °F (36.8 °C)] 97.3 °F (36.3 °C)  Heart Rate:  [65-81] 65  Resp:  [18] 18  BP: ()/(44-57) 101/44  Vital Signs (last 72 hrs)        07  -   0659  07  -   0659  07  -   0659 700  -   1124   Most Recent    Temp (°F) 97.3 -  98.4    97.9 -  98.5    97.3 -  98.3      97.3     97.3 (36.3)    Heart Rate 63 -  111    67 -  94    74 -  81    65 -  74     65    Resp 16 -  24    17 -  20      18      18     18    BP 70/53 -  178/98    98/55 -  129/68    78/44 -  120/57    101/44 -  112/56     101/44    SpO2 (%) 85 -  100    92 -  98    96 -  98      96     96            20  1634 20  0500 20  0500   Weight: 65.4 kg (144 lb 3.2 oz) 65.5 kg (144 lb 4.8 oz) 64.9 kg (143 lb)     Body mass index is 21.74 kg/m².    Intake/Output Summary (Last  24 hours) at 3/3/2020 1124  Last data filed at 3/2/2020 1700  Gross per 24 hour   Intake 240 ml   Output 200 ml   Net 40 ml     Diet Regular; Daily Fluid Restriction; 1500 mL Fluid Per Day  ----------------------------------------------------------------------------------------------------------------------  Physical exam:    HEENT:  Head:  Normocephalic and atraumatic.     Eyes:  Conjunctivae and EOM are normal.  Pupils are equal, round, and reactive to light.  No scleral icterus.    Neck:  Neck supple.  No JVD present.  No bruit.  Cardiovascular: Normal rate, regular rhythm, S1 S2+, NO S3 / S4  Pulmonary/Chest:  Vesicular breath sounds B/L, Clear to auscultation, with no added sounds.  Abdominal:  Soft.  Bowel sounds are normal.  No distension and no tenderness.  No organomegaly.  Neurological:  Alert and oriented to person, place, and time. No focal defecits  Skin:  Skin is warm and dry. No rash noted. No pallor.   Musculoskeletal:  No tenderness, and no deformity.  No red or swollen joints anywhere.   Lower extremities: No edema, Peripheral vascular:  2+ Pulses B/L DP.  ----------------------------------------------------------------------------------------------------------------------    ----------------------------------------------------------------------------------------------------------------------  Results from last 7 days   Lab Units 03/01/20  1316 03/01/20  0725 03/01/20  0136   TROPONIN T ng/mL 0.066* 0.056* 0.063*     Results from last 7 days   Lab Units 03/03/20  0441 03/03/20  0440 03/02/20  0548 03/01/20  0136 02/29/20  1237 02/29/20  0845   CRP mg/dL 2.65*  --  3.58*  --   --   --    LACTATE mmol/L  --   --   --   --  2.4* 3.3*   WBC 10*3/mm3  --  13.33* 17.41* 16.09*  --  12.35*   HEMOGLOBIN g/dL  --  13.4 12.9* 12.1*  --  11.7*   HEMATOCRIT %  --  45.1 41.1 40.6  --  39.1   MCV fL  --  93.4 90.1 94.0  --  94.2   MCHC g/dL  --  29.7* 31.4* 29.8*  --  29.9*   PLATELETS 10*3/mm3  --  244 236 213   --  182     Results from last 7 days   Lab Units 02/29/20  2330   PH, ARTERIAL pH units 7.346*   PO2 ART mm Hg 69.6*   PCO2, ARTERIAL mm Hg 41.4   HCO3 ART mmol/L 22.6     Results from last 7 days   Lab Units 03/03/20  0441 03/02/20  0548 03/01/20  0136  02/29/20  0845   SODIUM mmol/L 141 139 144  --  141   POTASSIUM mmol/L 3.8 3.3* 4.3  --  3.7   MAGNESIUM mg/dL 2.2 2.1 2.0   < >  --    CHLORIDE mmol/L 106 103 107  --  105   CO2 mmol/L 21.9* 24.6 23.2  --  22.0   BUN mg/dL 42* 27* 17  --  18   CREATININE mg/dL 1.40* 1.17 0.92  --  0.96   EGFR IF NONAFRICN AM mL/min/1.73 47* 58* 76  --  73   CALCIUM mg/dL 8.6 8.4 8.8  --  8.6   GLUCOSE mg/dL 105* 110* 154*  --  187*   ALBUMIN g/dL  --  3.00*  --   --  3.21*   BILIRUBIN mg/dL  --  0.4  --   --  0.4   ALK PHOS U/L  --  86  --   --  90   AST (SGOT) U/L  --  31  --   --  22   ALT (SGPT) U/L  --  23  --   --  18    < > = values in this interval not displayed.   Estimated Creatinine Clearance: 28.3 mL/min (A) (by C-G formula based on SCr of 1.4 mg/dL (H)).    No results found for: AMMONIA      Blood Culture   Date Value Ref Range Status   02/29/2020 No growth at 3 days  Preliminary   02/29/2020 No growth at 3 days  Preliminary     No results found for: URINECX  No results found for: WOUNDCX  No results found for: STOOLCX    I have personally looked at the labs and they are summarized above.  ----------------------------------------------------------------------------------------------------------------------  Imaging Results (Last 24 Hours)     Procedure Component Value Units Date/Time    XR Chest AP [[id number]] Collected:  03/03/20 0740     Updated:  03/03/20 0742    Narrative:       CR Chest 1 Vw 3/3/2020    INDICATION:   Follow-up congestive heart failure and lobar pneumonia. Acute respiratory failure with hypoxia today.     COMPARISON:    2/29/2020    FINDINGS:  Single portable AP view(s) of the chest.    No visible support lines.    The cardiac size is stable.  Interval decrease in pulmonary edema. No airspace consolidation. There are no pleural effusions. No pneumothorax.       Impression:       Decrease in pulmonary edema compared with 2/29/2020.    Signer Name: Arnoldo Bo MD   Signed: 3/3/2020 7:40 AM   Workstation Name: [id number]    [facility]        ----------------------------------------------------------------------------------------------------------------------    Assessment:  1.  Severe ischemic cardiomyopathy, EF is 20% on last  2.  Coronary arteries with a flat trend troponin elevation  3.  Advanced age  4.  Acute on chronic combined systolic and diastolic heart failure    Plan:  1.  Patient's blood pressure is hypotensive today I do not think he can tolerate Entresto discontinue Entresto for now and once the blood pressure is more stable we can consider starting him on a small dose of lisinopril like 2.5 mg a day  2.  Currently is compensated continue current medication  3.  Regarding coronary artery disease we will continue conservative management considering his advanced age      Cece Escobedo MD   03/03/20 11:24 AM

## 2020-03-03 NOTE — THERAPY DISCHARGE NOTE
Acute Care - Physical Therapy Initial Eval/Discharge  JOHNNA Kushal     Patient Name: Rafael Hussein  : 1923  MRN: [id number]  Today's Date: 3/3/2020      Date of Referral to PT: 20         Admit Date: 2020    Visit Dx:    ICD-10-CM ICD-9-CM   1. Community acquired pneumonia of right lower lobe of lung (CMS/[facility]) J18.1 481   2. Acute respiratory failure with hypoxia (CMS/[facility]) J96.01 518.81   3. Sepsis with acute hypoxic respiratory failure, due to unspecified organism, unspecified whether septic shock present (CMS/[facility]) A41.9 038.9    R65.20 995.91    J96.01 518.81     Patient Active Problem List   Diagnosis   • Community acquired pneumonia of right lower lobe of lung (CMS/HCC)   • Normocytic anemia   • Former smoker   • Advanced age   • Hypoalbuminemia   • Lactic acidosis   • Elevated brain natriuretic peptide (BNP) level   • Elevated troponin   • LBBB (left bundle branch block)   • History of abdominal aortic aneurysm (AAA)   • Black lung (CMS/HCC)   • Macular degeneration   • Emphysema lung (CMS/HCC)     Past Medical History:   Diagnosis Date   • Aortic aneurysm (CMS/HCC)    • Black lung (CMS/HCC)    • Emphysema lung (CMS/HCC) 2020   • History of abdominal aortic aneurysm (AAA) 2020   • Macular degeneration 2020     Past Surgical History:   Procedure Laterality Date   • ABDOMINAL AORTIC ANEURYSM REPAIR     • CARDIAC CATHETERIZATION      x3           PT ASSESSMENT (last 12 hours)      Physical Therapy Evaluation     Row Name 20 1627          PT Evaluation Time/Intention    Subjective Information  no complaints  -BC     Document Type  evaluation  -BC     Mode of Treatment  physical therapy  -BC     Patient Effort  poor  -BC     Row Name 20 1627          General Information    Patient Observations  alert;agree to therapy;cooperative  -BC     Prior Level of Function  all household mobility;mod assist:  -BC     Risks Reviewed  patient:;LOB;nausea/vomiting;dizziness;increased  discomfort;change in vital signs;increased drainage;lines disloged  -BC     Benefits Reviewed  patient:;improve function;increase independence;increase strength;increase balance;decrease pain;decrease risk of DVT;improve skin integrity;increase knowledge  -BC     Row Name 03/03/20 1627          Relationship/Environment    Lives With  spouse  -[facility] Name 03/03/20 1627          Resource/Environmental Concerns    Current Living Arrangements  home/apartment/condo  -[facility] Name 03/03/20 1627          Cognitive Assessment/Intervention- PT/OT    Follows Commands (Cognition)  does not follow one step commands  -[facility] Name 03/03/20 1627          Safety Issues, Functional Mobility    Safety Issues Affecting Function (Mobility)  ability to follow commands  -[facility] Name 03/03/20 1627          Mobility Assessment/Treatment    Extremity Weight-bearing Status  left lower extremity;right lower extremity  -BC     Left Lower Extremity (Weight-bearing Status)  full weight-bearing (FWB)  -BC     Right Lower Extremity (Weight-bearing Status)  full weight-bearing (FWB)  -[facility] Name 03/03/20 1627          Bed Mobility Assessment/Treatment    Bed Mobility Assessment/Treatment  bed mobility (all) activities  -BC     [place] Level (Bed Mobility)  moderate assist (50% patient effort)  -BC     Bed Mobility, Safety Issues  decreased use of arms for pushing/pulling;decreased use of legs for bridging/pushing  -BC     Assistive Device (Bed Mobility)  bed rails  -[facility] Name 03/03/20 1627          Transfer Assessment/Treatment    Transfer Assessment/Treatment  sit-stand transfer;stand-sit transfer  -BC     Maintains Weight-bearing Status (Transfers)  able to maintain  -BC     Sit-Stand Snyder (Transfers)  moderate assist (50% patient effort)  -BC     Stand-Sit [place] (Transfers)  moderate assist (50% patient effort)  -BC     Row Name 03/03/20 1627          Sit-Stand Transfer    Assistive Device (Sit-Stand  Transfers)  walker, front-wheeled  -BC     Row Name 03/03/20 1627          Stand-Sit Transfer    Assistive Device (Stand-Sit Transfers)  walker, front-wheeled  -BC     Row Name 03/03/20 1627          Gait/Stairs Assessment/Training    Gait/Stairs Assessment/Training  gait/ambulation independence  -BC     [place] Level (Gait)  moderate assist (50% patient effort)  -BC     Assistive Device (Gait)  walker, front-wheeled  -BC     Distance in Feet (Gait)  50 ft  -BC     Row Name 03/03/20 1627          General ROM    GENERAL ROM COMMENTS  ROM wfl  -BC     Row Name 03/03/20 1627          MMT (Manual Muscle Testing)    General MMT Comments  MMT 3+/5  -BC     Row Name 03/03/20 1627          Coping    Observed Emotional State  accepting;calm;cooperative  -BC     Verbalized Emotional State  acceptance  -BC     Row Name 03/03/20 1627          Plan of Care Review    Plan of Care Reviewed With  patient  -BC     Row Name 03/03/20 1627          Physical Therapy Clinical Impression    Date of Referral to PT  03/02/20  -BC     Functional Level at Time of Evaluation (PT Clinical Impression)  fair  -BC     Criteria for Skilled Interventions Met (PT Clinical Impression)  no;no problems identified which require skilled intervention  -BC     Row Name 03/03/20 1627          Positioning and Restraints    Pre-Treatment Position  in bed  -BC     Post Treatment Position  bed  -BC     In Bed  notified nsg;call light within reach;encouraged to call for assist;with family/caregiver;side rails up x3  -BC       User Key  (r) = Recorded By, (t) = Taken By, (c) = Cosigned By    Initials Name Provider Type    Mili Sheridan PT Physical Therapist          Physical Therapy Education                 Title: PT OT SLP Therapies (In Progress)     Topic: Physical Therapy (In Progress)     Point: Mobility training (In Progress)     Description:   Instruct learner(s) on safety and technique for assisting patient out of bed, chair or wheelchair.   Instruct in the proper use of assistive devices, such as walker, crutches, cane or brace.              Patient Friendly Description:   It's important to get you on your feet again, but we need to do so in a way that is safe for you. Falling has serious consequences, and your personal safety is the most important thing of all.        When it's time to get out of bed, one of us or a family member will sit next to you on the bed to give you support.     If your doctor or nurse tells you to use a walker, crutches, a cane, or a brace, be sure you use it every time you get out of bed, even if you think you don't need it.    Learning Progress Summary           Patient Acceptance, E, NR by BC at 3/3/2020 1649                   Point: Home exercise program (In Progress)     Description:   Instruct learner(s) on appropriate technique for monitoring, assisting and/or progressing patient with therapeutic exercises and activities.              Learning Progress Summary           Patient Acceptance, E, NR by BC at 3/3/2020 1649                   Point: Body mechanics (In Progress)     Description:   Instruct learner(s) on proper positioning and spine alignment for patient and/or caregiver during mobility tasks and/or exercises.              Learning Progress Summary           Patient Acceptance, E, NR by BC at 3/3/2020 1649                   Point: Precautions (In Progress)     Description:   Instruct learner(s) on prescribed precautions during mobility and gait tasks              Learning Progress Summary           Patient Acceptance, E, NR by BC at 3/3/2020 1649                               User Key     Initials Effective Dates Name Provider Type Discipline    BC 03/14/16 -  Mili Vasquez PT Physical Therapist PT                PT Recommendation and Plan     Plan of Care Reviewed With: patient         Time Calculation:   PT Charges     Row Name 03/03/20 1649             Time Calculation    PT Received On  03/03/20  -BC       PT Goal Re-Cert Due Date  03/17/20  -BC         Time Calculation- PT    Total Timed Code Minutes- PT  60 minute(s)  -BC         Timed Charges    19098 - Gait Training Minutes   15  -BC        User Key  (r) = Recorded By, (t) = Taken By, (c) = Cosigned By    Initials Name Provider Type    BC Mili Vasquez, PT Physical Therapist        Therapy Charges for Today     Code Description Service Date Service Provider Modifiers Qty    09770509862 HC GAIT TRAINING EA 15 MIN 3/3/2020 Mili Vasquez, PT GP 1    [id number] HC PT EVAL MOD COMPLEXITY 3 3/3/2020 Mili Vasquez, PT GP 1                    Mili Vasquez, PT  3/3/2020

## 2020-03-03 NOTE — PROGRESS NOTES
[facility] Medicine Services  PROGRESS NOTE     Patient Identification:  Name:  Rafael Hussein  Age:  89 y.o.  Sex:  male  :  1923  MRN:  [id number]  Visit Number:  [id number]  Primary Care Provider:  Rio Hardin APRN    Length of stay:  3    ----------------------------------------------------------------------------------------------------------------------  Subjective     Chief Complaint:  Follow up for acute hypoxic respiratory failure, sepsis, pneumonia, CHF exacerbation, NSTEMI    History of Presenting Illness/Hospital Course:  Patient is a 89 yo male with past medical history significant for emphysema, AAA s/p repair, 's pneumoconiosis, and CAD that presented to [facility] ED with complaint of shortness of breath and chest discomfort. Patient was admitted to the telemetry floor with acute hypoxic respiratory failure, sepsis 2/2 bibasilar community acquired pneumonia and acute CHF exacerbation.  See admitting history and physical for further details.  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  Cardiology evaluated the patient and started patient on Entresto, torsemide, and Coreg.  Patient diuresed well, however developed hypotension and acute renal failure.  Per cardiology the torsemide and Entresto were discontinued, and if blood pressure stays stable he can be started on low-dose lisinopril.  He has been continued on Coreg.    Subjective:  Today, the patient was sitting up in bedside chair attempting to feed himself.  He states he feels well, denies any shortness of breath.  He is on room air.  He states he is struggling to eat and feed himself, request someone to come help him.  Otherwise, he is of no complaints.  Daughter not present at bedside today.  Overnight he did develop some confusion and required one-on-one sitting.  He is pleasant this morning, intermittently  confused, not agitated or combative. He denies any chest pain or pressure. Denies any cough. Eating well. Denies any fevers or chills. Denies any abdominal pain, nausea, vomiting or diarrhea. Denies any urinary symptoms. No BM documented in chart, he is unsure of his last bowel movement.     Present during exam: N/A  ----------------------------------------------------------------------------------------------------------------------  Objective     Consults:  • Cardiology     Procedures:  • 3/1/2020: Transthoracic echocardiogram   · The left ventricular cavity is moderate-to-severely dilated.  · Left ventricular mass index is increased.  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  · Left ventricular systolic function is severely decreased.  · Left ventricular diastolic (grade III) consistent with fixed restricive pattern.  · Left atrial cavity size is severely dilated.  · Moderate aortic valve regurgitation is present.  · Moderate mitral valve regurgitation is present  · The right atrium is also dilated  · The E/E~ is over 40 indicating increase left atrium pressure    Current [facility] Meds:    aspirin 81 mg Oral Daily   atorvastatin 40 mg Oral Nightly   carvedilol 3.125 mg Oral BID With Meals   cefTRIAXone 1 g Intravenous Q24H   famotidine 20 mg Oral Daily   guaiFENesin 600 mg Oral Q12H   heparin (porcine) 5,000 Units Subcutaneous Q8H   ipratropium 0.5 mg Nebulization Q6H   lactobacillus acidophilus 1 capsule Oral Daily   multivitamin 1 tablet Oral Daily   sodium chloride 10 mL Intravenous Q12H        ----------------------------------------------------------------------------------------------------------------------  Vital Signs:  Temp:  [97.3 °F (36.3 °C)-98.3 °F (36.8 °C)] 97.3 °F (36.3 °C)  Heart Rate:  [65-81] 65  Resp:  [18] 18  BP: ()/(44-57) 101/44  Mean Arterial Pressure (Non-Invasive) for the past 24 hrs (Last 3 readings):   Noninvasive MAP (mmHg)   03/03/20 1121 71   03/02/20 1750  85     SpO2 Percentage    03/02/20 0708 03/03/20 0046 03/03/20 0715   SpO2: 98% 96% 96%     SpO2:  [96 %] 96 %  on  Flow (L/min):  [2] 2;   Device (Oxygen Therapy): room air    Body mass index is 21.74 kg/m².  Wt Readings from Last 3 Encounters:   03/02/20 64.9 kg (143 lb)        Intake/Output Summary (Last 24 hours) at 3/3/2020 1259  Last data filed at 3/2/2020 1700  Gross per 24 hour   Intake 240 ml   Output 200 ml   Net 40 ml       Diet Regular; Daily Fluid Restriction; 1500 mL Fluid Per Day  ----------------------------------------------------------------------------------------------------------------------  Physical exam:   Physical Exam   Constitutional: He is oriented to person, place, and time. He appears well-developed and well-nourished. He is cooperative.  Non-toxic appearance. He appears ill (Chronically ). No distress.   Elderly, pleasant, sitting up in bed side chair eating lunch tray, no acute distress noted.    HENT:   Head: Normocephalic and atraumatic.   Mouth/Throat: Mucous membranes are normal.   Eyes: Pupils are equal, round, and reactive to light. Conjunctivae are normal. No scleral icterus.   Neck: Neck supple.   Cardiovascular: Normal rate, regular rhythm, normal heart sounds and intact distal pulses. Exam reveals no gallop and no friction rub.   No murmur heard.  Pulses:       Dorsalis pedis pulses are 2+ on the right side, and 2+ on the left side.        Posterior tibial pulses are 2+ on the right side, and 2+ on the left side.   Pulmonary/Chest: Effort normal. No accessory muscle usage. No tachypnea. No respiratory distress. He has decreased breath sounds in the right lower field and the left lower field. He has no wheezes. He has no rhonchi. He has no rales. He exhibits no tenderness.   Significantly improved aeration bilaterally    Abdominal: Soft. Bowel sounds are normal. He exhibits no distension. There is no tenderness. There is no rebound and no guarding.   Genitourinary:      Genitourinary Comments: No ochoa catheter in place, making urine.   Musculoskeletal: He exhibits no tenderness or deformity.        Right lower leg: He exhibits edema (trace).        Left lower leg: He exhibits edema (trace).   Neurological: He is alert and oriented to person, place, and time. He displays atrophy (global, age related). No cranial nerve deficit or sensory deficit.   Awake and alert. Follows commands. No focal neuro deficits on exam. Moves all extremities.   Skin: Skin is warm and dry. Capillary refill takes less than 2 seconds. No rash noted. No erythema. No pallor.   Psychiatric: He has a normal mood and affect. His speech is normal and behavior is normal. Judgment and thought content normal. He exhibits abnormal recent memory.   Nursing note and vitals reviewed.     ----------------------------------------------------------------------------------------------------------------------  Tele:    Patient refusing tele at this time    I have personally reviewed the EKG/Telemetry strips   ----------------------------------------------------------------------------------------------------------------------  Results from last 7 days   Lab Units 03/01/20  1316 03/01/20  0725 03/01/20  0136   TROPONIN T ng/mL 0.066* 0.056* 0.063*     Results from last 7 days   Lab Units 03/03/20  0441 03/02/20  0548 03/01/20  0136 02/29/20  0845   PROBNP pg/mL 4,983.0* 16,421.0* 7,741.0* 5,965.0*       Results from last 7 days   Lab Units 02/29/20  2330   PH, ARTERIAL pH units 7.346*   PO2 ART mm Hg 69.6*   PCO2, ARTERIAL mm Hg 41.4   HCO3 ART mmol/L 22.6     Results from last 7 days   Lab Units 03/03/20  0441 03/03/20  0440 03/02/20  0548 03/01/20  0136 02/29/20  1237 02/29/20  0845   CRP mg/dL 2.65*  --  3.58*  --   --   --    LACTATE mmol/L  --   --   --   --  2.4* 3.3*   WBC 10*3/mm3  --  13.33* 17.41* 16.09*  --  12.35*   HEMOGLOBIN g/dL  --  13.4 12.9* 12.1*  --  11.7*   HEMATOCRIT %  --  45.1 41.1 40.6  --  39.1    MCV fL  --  93.4 90.1 94.0  --  94.2   MCHC g/dL  --  29.7* 31.4* 29.8*  --  29.9*   PLATELETS 10*3/mm3  --  244 236 213  --  182     Results from last 7 days   Lab Units 03/03/20  0441 03/02/20  0548 03/01/20  0136  02/29/20  0845   SODIUM mmol/L 141 139 144  --  141   POTASSIUM mmol/L 3.8 3.3* 4.3  --  3.7   MAGNESIUM mg/dL 2.2 2.1 2.0   < >  --    CHLORIDE mmol/L 106 103 107  --  105   CO2 mmol/L 21.9* 24.6 23.2  --  22.0   BUN mg/dL 42* 27* 17  --  18   CREATININE mg/dL 1.40* 1.17 0.92  --  0.96   EGFR IF NONAFRICN AM mL/min/1.73 47* 58* 76  --  73   CALCIUM mg/dL 8.6 8.4 8.8  --  8.6   GLUCOSE mg/dL 105* 110* 154*  --  187*   ALBUMIN g/dL  --  3.00*  --   --  3.21*   BILIRUBIN mg/dL  --  0.4  --   --  0.4   ALK PHOS U/L  --  86  --   --  90   AST (SGOT) U/L  --  31  --   --  22   ALT (SGPT) U/L  --  23  --   --  18    < > = values in this interval not displayed.   Estimated Creatinine Clearance: 28.3 mL/min (A) (by C-G formula based on SCr of 1.4 mg/dL (H)).    Lab Results   Component Value Date    HGBA1C 5.80 (H) 02/29/2020     Lab Results   Component Value Date    TSH 2.400 03/01/2020     Microbiology Results (last 10 days)     Procedure Component Value - Date/Time    Mycoplasma Pneumoniae Antibody, IgM - Blood, Arm, Left [[id number]]  (Normal) Collected:  02/29/20 1718    Lab Status:  Final result Specimen:  Blood from Arm, Left Updated:  02/29/20 1816     Mycoplasma pneumo IgM Negative    Respiratory Panel, PCR - Swab, Nasopharynx [[id number]]  (Normal) Collected:  02/29/20 1712    Lab Status:  Final result Specimen:  Swab from Nasopharynx Updated:  02/29/20 8722     ADENOVIRUS, PCR Not Detected     Coronavirus 229E Not Detected     Coronavirus HKU1 Not Detected     Coronavirus NL63 Not Detected     Coronavirus OC43 Not Detected     Human Metapneumovirus Not Detected     Human Rhinovirus/Enterovirus Not Detected     Influenza B PCR Not Detected     Parainfluenza Virus 1 Not Detected     Parainfluenza Virus  2 Not Detected     Parainfluenza Virus 3 Not Detected     Parainfluenza Virus 4 Not Detected     Bordetella pertussis pcr Not Detected     Influenza A H1 2009 PCR Not Detected     Chlamydophila pneumoniae PCR Not Detected     Mycoplasma pneumo by PCR Not Detected     Influenza A PCR Not Detected     Influenza A H3 Not Detected     Influenza A H1 Not Detected     RSV, PCR Not Detected    Blood Culture - Blood, Arm, Right [[id number]] Collected:  02/29/20 0910    Lab Status:  Preliminary result Specimen:  Blood from Arm, Right Updated:  03/03/20 1030     Blood Culture No growth at 3 days    Blood Culture - Blood, Arm, Right [[id number]] Collected:  02/29/20 0845    Lab Status:  Preliminary result Specimen:  Blood from Arm, Right Updated:  03/03/20 1030     Blood Culture No growth at 3 days         I have personally reviewed the above laboratory results.   ----------------------------------------------------------------------------------------------------------------------  Imaging Results (Last 24 Hours)     Procedure Component Value Units Date/Time    XR Chest AP [[id number]] Collected:  03/03/20 0740     Updated:  03/03/20 0742    Narrative:       CR Chest 1 Vw 3/3/2020    INDICATION:   Follow-up congestive heart failure and lobar pneumonia. Acute respiratory failure with hypoxia today.     COMPARISON:    2/29/2020    FINDINGS:  Single portable AP view(s) of the chest.    No visible support lines.    The cardiac size is stable. Interval decrease in pulmonary edema. No airspace consolidation. There are no pleural effusions. No pneumothorax.       Impression:       Decrease in pulmonary edema compared with 2/29/2020.    Signer Name: Arnoldo Bo MD   Signed: 3/3/2020 7:40 AM   Workstation Name: [employer]    [facility]        I have personally reviewed the above radiology results.    ----------------------------------------------------------------------------------------------------------------------  Assessment/Plan     · Acute combined diastolic/systolic CHF exacerbation, severe ischemic cardiomyopathy with grade III diastolic dysfunction: ECHO reviewed. Cardiology on board, input/assistance is appreciated. Pt was previously started on Coreg, Entresto, and Torsemide. He diuresed well, however overnight he developed hypotension and this AM his creatinine increased to 1.4.  Cardiology, Entresto has been discontinued, torsemide previously held.  Continue with Coreg with holding parameters to prevent hypotension and/or bradycardia.  Closely monitor renal function, also monitor volume status with strict I's and O's and daily weights.  Per cardiology, plan to start low-dose lisinopril as blood pressure and renal function tolerate, possibly tomorrow.  proBNP significantly improved, chest x-ray with significant improvement in vascular congestion.  Repeat labs in a.m.   · Sepsis, present on admission, secondary to bibasilar community acquired pneumonia: Blood cultures with NGTD. Respiratory panel PCR negative. Continue empiric coverage with IV rocephin monotherapy.  Plan to de-escalate oral agents tomorrow.  Continue scheduled inhalants. Monitor cultures for final results. WBC remains elevated but could be due to recent IV corticosteroid administration and is significantly improved. Monitor CRP. Repeat CBC in AM. Vitals stable, afebrile.  · Acute hypoxic respiratory failure: Patient tolerating room air, monitor closely on pulse oximetry. Supplemental O2 as necessary to titrate SpO2 > 90%. Treatment of pneumonia and CHF as outlined above.  · Acute kidney injury: Iatrogenic secondary to diuresis, diuretics on hold.  Avoid any further nephrotoxins as much as possible.  Repeat chemistry panel in a.m., closely monitor renal function and urinary output.  · Troponin elevation in setting of history of CAD,  with reported stenting in the past: Flat trend. Cardiology on board. Patient without any further chest pain. Continues on medical management.  Coreg on board.  Continue with daily aspirin and statin.   · Acute hypokalemia: Resolved with supplementation. Tele monitoring. Repeat chem panel in AM. Mag WNL.  · LBBB of unknown chronicity: Cardiology on board, likely due to previous ischemia. No recent EKG to compare. Monitor on tele.   · Normocytic anemia: Hemoglobin stable, no active bleeding, unknown hgb baseline. Monitor closely, transfuse if necessary. Repeat CBC in AM.   · 's pneumoconiosis and emphysema: No acute exacerbation. No wheezing on exam. Monitor closely, nebs on board, Supp O2 as needed.  · History of AAA, s/p repair in past: Not consistent with hx of dissection. Continue to monitor.   · Hypoalbuminemia: Likely multifactorial, closely monitor and repeat chem panel in AM.  · Malnutrition: Nutrition consult  · Advanced age with related debility: PT/OT consult in place.     --------------------------------------------------  DVT Prophylaxis: SQ heparin   GI Prophylaxis: Pepcid   FEN: No IV fluids. Replace electrolytes per protocol as necessary. Cardiac diet/fluid restriction.  Activity: Up with assistance, ambulate per shift (uses cane)  Home O2: none   Disposition: Started on new cardiac meds, remains inpatient for monitoring and further treatment.  --------------------------------------------------    The patient is high risk due to the following diagnoses/reasons:  CHF exacerbation, respiratory failure, pneumonia, sepsis, trop elevation, cardiomyopathy, hx of AAA status post repair, advanced age and debility    I have discussed the patient's assessment and plan with the patient, daughter at bedside, and nursing staff.      Ketty Reddy PA-C  Hospitalist Service -- [facility]   Pager: [phone]    03/03/20  12:59 PM    Attending Physician: Quentin Oliveros  MD    ----------------------------------------------------------------------------------------------------------------------

## 2020-03-03 NOTE — PLAN OF CARE
Pt lying in bed with daughter at bedside. Pt worked with PT and also got up with nursing staff assistance several times. Did well with assistance. No complaints will continue to monitor

## 2020-03-04 ENCOUNTER — APPOINTMENT (OUTPATIENT)
Dept: GENERAL RADIOLOGY | Facility: HOSPITAL | Age: 85
End: 2020-03-04

## 2020-03-04 LAB
ALBUMIN SERPL-MCNC: 2.89 G/DL (ref 3.5–5.2)
ALBUMIN/GLOB SERPL: 1 G/DL
ALP SERPL-CCNC: 77 U/L (ref 39–117)
ALT SERPL W P-5'-P-CCNC: 19 U/L (ref 1–41)
ANION GAP SERPL CALCULATED.3IONS-SCNC: 12 MMOL/L (ref 5–15)
AST SERPL-CCNC: 23 U/L (ref 1–40)
BASOPHILS # BLD AUTO: 0.09 10*3/MM3 (ref 0–0.2)
BASOPHILS NFR BLD AUTO: 0.8 % (ref 0–1.5)
BILIRUB SERPL-MCNC: 0.3 MG/DL (ref 0.2–1.2)
BUN BLD-MCNC: 30 MG/DL (ref 8–23)
BUN/CREAT SERPL: 33.7 (ref 7–25)
CALCIUM SPEC-SCNC: 7.9 MG/DL (ref 8.2–9.6)
CHLORIDE SERPL-SCNC: 110 MMOL/L (ref 98–107)
CO2 SERPL-SCNC: 22 MMOL/L (ref 22–29)
CREAT BLD-MCNC: 0.89 MG/DL (ref 0.76–1.27)
DEPRECATED RDW RBC AUTO: 49.8 FL (ref 37–54)
EOSINOPHIL # BLD AUTO: 0.29 10*3/MM3 (ref 0–0.4)
EOSINOPHIL NFR BLD AUTO: 2.6 % (ref 0.3–6.2)
ERYTHROCYTE [DISTWIDTH] IN BLOOD BY AUTOMATED COUNT: 14.6 % (ref 12.3–15.4)
GFR SERPL CREATININE-BSD FRML MDRD: 79 ML/MIN/1.73
GLOBULIN UR ELPH-MCNC: 2.9 GM/DL
GLUCOSE BLD-MCNC: 117 MG/DL (ref 65–99)
HCT VFR BLD AUTO: 40.6 % (ref 37.5–51)
HGB BLD-MCNC: 12.2 G/DL (ref 13–17.7)
IMM GRANULOCYTES # BLD AUTO: 0.09 10*3/MM3 (ref 0–0.05)
IMM GRANULOCYTES NFR BLD AUTO: 0.8 % (ref 0–0.5)
LYMPHOCYTES # BLD AUTO: 1.66 10*3/MM3 (ref 0.7–3.1)
LYMPHOCYTES NFR BLD AUTO: 14.8 % (ref 19.6–45.3)
MAGNESIUM SERPL-MCNC: 2 MG/DL (ref 1.7–2.3)
MCH RBC QN AUTO: 27.9 PG (ref 26.6–33)
MCHC RBC AUTO-ENTMCNC: 30 G/DL (ref 31.5–35.7)
MCV RBC AUTO: 92.7 FL (ref 79–97)
MONOCYTES # BLD AUTO: 1.45 10*3/MM3 (ref 0.1–0.9)
MONOCYTES NFR BLD AUTO: 12.9 % (ref 5–12)
NEUTROPHILS # BLD AUTO: 7.64 10*3/MM3 (ref 1.7–7)
NEUTROPHILS NFR BLD AUTO: 68.1 % (ref 42.7–76)
NRBC BLD AUTO-RTO: 0 /100 WBC (ref 0–0.2)
NT-PROBNP SERPL-MCNC: 2045 PG/ML (ref 5–1800)
PHOSPHATE SERPL-MCNC: 2.8 MG/DL (ref 2.5–4.5)
PLATELET # BLD AUTO: 198 10*3/MM3 (ref 140–450)
PMV BLD AUTO: 9.6 FL (ref 6–12)
POTASSIUM BLD-SCNC: 3.9 MMOL/L (ref 3.5–5.2)
PROT SERPL-MCNC: 5.8 G/DL (ref 6–8.5)
RBC # BLD AUTO: 4.38 10*6/MM3 (ref 4.14–5.8)
SODIUM BLD-SCNC: 144 MMOL/L (ref 136–145)
WBC NRBC COR # BLD: 11.22 10*3/MM3 (ref 3.4–10.8)

## 2020-03-04 PROCEDURE — 84100 ASSAY OF PHOSPHORUS: CPT | Performed by: PHYSICIAN ASSISTANT

## 2020-03-04 PROCEDURE — 85025 COMPLETE CBC W/AUTO DIFF WBC: CPT | Performed by: INTERNAL MEDICINE

## 2020-03-04 PROCEDURE — 25010000002 HEPARIN (PORCINE) PER 1000 UNITS: Performed by: INTERNAL MEDICINE

## 2020-03-04 PROCEDURE — 80053 COMPREHEN METABOLIC PANEL: CPT | Performed by: INTERNAL MEDICINE

## 2020-03-04 PROCEDURE — 94799 UNLISTED PULMONARY SVC/PX: CPT

## 2020-03-04 PROCEDURE — 99232 SBSQ HOSP IP/OBS MODERATE 35: CPT | Performed by: PHYSICIAN ASSISTANT

## 2020-03-04 PROCEDURE — 99232 SBSQ HOSP IP/OBS MODERATE 35: CPT | Performed by: SPECIALIST

## 2020-03-04 PROCEDURE — 74230 X-RAY XM SWLNG FUNCJ C+: CPT | Performed by: RADIOLOGY

## 2020-03-04 PROCEDURE — 74230 X-RAY XM SWLNG FUNCJ C+: CPT

## 2020-03-04 PROCEDURE — 83880 ASSAY OF NATRIURETIC PEPTIDE: CPT | Performed by: PHYSICIAN ASSISTANT

## 2020-03-04 PROCEDURE — 92611 MOTION FLUOROSCOPY/SWALLOW: CPT | Performed by: SPEECH-LANGUAGE PATHOLOGIST

## 2020-03-04 PROCEDURE — 97530 THERAPEUTIC ACTIVITIES: CPT

## 2020-03-04 PROCEDURE — 83735 ASSAY OF MAGNESIUM: CPT | Performed by: PHYSICIAN ASSISTANT

## 2020-03-04 PROCEDURE — 97116 GAIT TRAINING THERAPY: CPT

## 2020-03-04 RX ORDER — CEFDINIR 300 MG/1
300 CAPSULE ORAL EVERY 12 HOURS SCHEDULED
Status: DISCONTINUED | OUTPATIENT
Start: 2020-03-04 | End: 2020-03-05 | Stop reason: HOSPADM

## 2020-03-04 RX ORDER — LISINOPRIL 2.5 MG/1
2.5 TABLET ORAL
Status: DISCONTINUED | OUTPATIENT
Start: 2020-03-04 | End: 2020-03-05 | Stop reason: HOSPADM

## 2020-03-04 RX ADMIN — Medication 1 TABLET: at 09:17

## 2020-03-04 RX ADMIN — Medication 1 CAPSULE: at 09:16

## 2020-03-04 RX ADMIN — CEFDINIR 300 MG: 300 CAPSULE ORAL at 20:41

## 2020-03-04 RX ADMIN — CEFDINIR 300 MG: 300 CAPSULE ORAL at 12:34

## 2020-03-04 RX ADMIN — CARVEDILOL 3.12 MG: 3.12 TABLET, FILM COATED ORAL at 16:43

## 2020-03-04 RX ADMIN — HEPARIN SODIUM 5000 UNITS: 5000 INJECTION INTRAVENOUS; SUBCUTANEOUS at 04:48

## 2020-03-04 RX ADMIN — SODIUM CHLORIDE, PRESERVATIVE FREE 10 ML: 5 INJECTION INTRAVENOUS at 20:41

## 2020-03-04 RX ADMIN — Medication 5 MG: at 22:06

## 2020-03-04 RX ADMIN — FAMOTIDINE 20 MG: 20 TABLET, FILM COATED ORAL at 09:17

## 2020-03-04 RX ADMIN — ATORVASTATIN CALCIUM 40 MG: 40 TABLET, FILM COATED ORAL at 20:41

## 2020-03-04 RX ADMIN — HEPARIN SODIUM 5000 UNITS: 5000 INJECTION INTRAVENOUS; SUBCUTANEOUS at 20:41

## 2020-03-04 RX ADMIN — LISINOPRIL 2.5 MG: 2.5 TABLET ORAL at 09:17

## 2020-03-04 RX ADMIN — SODIUM CHLORIDE, PRESERVATIVE FREE 10 ML: 5 INJECTION INTRAVENOUS at 09:16

## 2020-03-04 RX ADMIN — GUAIFENESIN 600 MG: 600 TABLET, EXTENDED RELEASE ORAL at 09:17

## 2020-03-04 RX ADMIN — ASPIRIN 81 MG: 81 TABLET, CHEWABLE ORAL at 09:17

## 2020-03-04 RX ADMIN — GUAIFENESIN 600 MG: 600 TABLET, EXTENDED RELEASE ORAL at 20:41

## 2020-03-04 RX ADMIN — HYDROXYZINE HYDROCHLORIDE 25 MG: 25 TABLET, FILM COATED ORAL at 20:41

## 2020-03-04 NOTE — PROGRESS NOTES
Patient Identification:  Name:  Rafael Hussein  Age:  89 y.o.  Sex:  male  :  1923  MRN:  [id number]  Visit Number:  [id number]    Chief Complaint:   Acute on chronic combined systolic and diastolic heart failure, sepsis with pneumonia, flat trend elevated troponin, severe cardiomyopathy    Subjective:    Patient feels very well he denies any symptoms whatsoever he is cheerful and ambulating  ----------------------------------------------------------------------------------------------------------------------  Current Hospital Meds:    aspirin 81 mg Oral Daily   atorvastatin 40 mg Oral Nightly   carvedilol 3.125 mg Oral BID With Meals   cefdinir 300 mg Oral Q12H   famotidine 20 mg Oral Daily   guaiFENesin 600 mg Oral Q12H   heparin (porcine) 5,000 Units Subcutaneous Q8H   lactobacillus acidophilus 1 capsule Oral Daily   lisinopril 2.5 mg Oral Q24H   multivitamin 1 tablet Oral Daily   sodium chloride 10 mL Intravenous Q12H        ----------------------------------------------------------------------------------------------------------------------  Vital Signs:  Temp:  [97.3 °F (36.3 °C)-97.9 °F (36.6 °C)] 97.9 °F (36.6 °C)  Heart Rate:  [65-83] 83  Resp:  [18] 18  BP: (101-118)/(44-59) 118/54  Vital Signs (last 72 hrs)        0700  -   0659  0700  -   0659  07  -   0659  07  -   1103   Most Recent    Temp (°F) 97.9 -  98.5    97.3 -  98.3    97.3 -  97.9      97.9     97.9 (36.6)    Heart Rate 67 -  94    74 -  81    65 -  77      83     83    Resp 17 -  20      18      18      18     18    BP 98/55 -  129/68    78/44 -  120/57    101/44 -  112/56      118/54     118/54    SpO2 (%) 92 -  98    96 -  98    93 -  99      95     95            20  0500 20  0500 20  0500   Weight: 65.5 kg (144 lb 4.8 oz) 64.9 kg (143 lb) 64 kg (141 lb)     Body mass index is 21.44 kg/m².    Intake/Output Summary (Last 24 hours) at 3/4/2020 1103  Last data filed at  3/4/2020 0900  Gross per 24 hour   Intake 480 ml   Output 900 ml   Net -420 ml     Diet Regular; Daily Fluid Restriction; 1500 mL Fluid Per Day  ----------------------------------------------------------------------------------------------------------------------  Physical exam:    HEENT:  Head:  Normocephalic and atraumatic.     Eyes:  Conjunctivae and EOM are normal.  Pupils are equal, round, and reactive to light.  No scleral icterus.    Neck:  Neck supple.  No JVD present.  No bruit.  Cardiovascular: Normal rate, regular rhythm, S1 S2+, NO S3 / S4  Pulmonary/Chest:  Vesicular breath sounds B/L, Clear to auscultation, with no added sounds.  Abdominal:  Soft.  Bowel sounds are normal.  No distension and no tenderness.  No organomegaly.  Neurological:  Alert and oriented to person, place, and time. No focal defecits  Skin:  Skin is warm and dry. No rash noted. No pallor.   Musculoskeletal:  No tenderness, and no deformity.  No red or swollen joints anywhere.   Lower extremities: No edema, Peripheral vascular:  2+ Pulses B/L DP.  ----------------------------------------------------------------------------------------------------------------------    ----------------------------------------------------------------------------------------------------------------------  Results from last 7 days   Lab Units 03/01/20  1316 03/01/20  0725 03/01/20  0136   TROPONIN T ng/mL 0.066* 0.056* 0.063*     Results from last 7 days   Lab Units 03/04/20  0452 03/03/20  0441 03/03/20  0440 03/02/20  0548  02/29/20  1237 02/29/20  0845   CRP mg/dL  --  2.65*  --  3.58*  --   --   --    LACTATE mmol/L  --   --   --   --   --  2.4* 3.3*   WBC 10*3/mm3 11.22*  --  13.33* 17.41*   < >  --  12.35*   HEMOGLOBIN g/dL 12.2*  --  13.4 12.9*   < >  --  11.7*   HEMATOCRIT % 40.6  --  45.1 41.1   < >  --  39.1   MCV fL 92.7  --  93.4 90.1   < >  --  94.2   MCHC g/dL 30.0*  --  29.7* 31.4*   < >  --  29.9*   PLATELETS 10*3/mm3 198  --  244 236   <  >  --  182    < > = values in this interval not displayed.     Results from last 7 days   Lab Units 02/29/20  2330   PH, ARTERIAL pH units 7.346*   PO2 ART mm Hg 69.6*   PCO2, ARTERIAL mm Hg 41.4   HCO3 ART mmol/L 22.6     Results from last 7 days   Lab Units 03/04/20  0452 03/03/20  0441 03/02/20  0548  02/29/20  0845   SODIUM mmol/L 144 141 139   < > 141   POTASSIUM mmol/L 3.9 3.8 3.3*   < > 3.7   MAGNESIUM mg/dL 2.0 2.2 2.1   < >  --    CHLORIDE mmol/L 110* 106 103   < > 105   CO2 mmol/L 22.0 21.9* 24.6   < > 22.0   BUN mg/dL 30* 42* 27*   < > 18   CREATININE mg/dL 0.89 1.40* 1.17   < > 0.96   EGFR IF NONAFRICN AM mL/min/1.73 79 47* 58*   < > 73   CALCIUM mg/dL 7.9* 8.6 8.4   < > 8.6   GLUCOSE mg/dL 117* 105* 110*   < > 187*   ALBUMIN g/dL 2.89*  --  3.00*  --  3.21*   BILIRUBIN mg/dL 0.3  --  0.4  --  0.4   ALK PHOS U/L 77  --  86  --  90   AST (SGOT) U/L 23  --  31  --  22   ALT (SGPT) U/L 19  --  23  --  18    < > = values in this interval not displayed.   Estimated Creatinine Clearance: 43.9 mL/min (by C-G formula based on SCr of 0.89 mg/dL).    No results found for: AMMONIA      Blood Culture   Date Value Ref Range Status   02/29/2020 No growth at 4 days  Preliminary   02/29/2020 No growth at 4 days  Preliminary     No results found for: URINECX  No results found for: WOUNDCX  No results found for: STOOLCX    I have personally looked at the labs and they are summarized above.  ----------------------------------------------------------------------------------------------------------------------  Imaging Results (Last 24 Hours)     ** No results found for the last 24 hours. **        ----------------------------------------------------------------------------------------------------------------------    Assessment:  1.  Severe ischemic cardiomyopathy, EF is less than 20%  2.  Coronary arteries with flat trend troponin elevation  3.  Advanced age  4.  Acute on chronic combined systolic and diastolic heart  failure  5.  Status post AAA repair  6.  Pneumonia    Plan:  1.  Patient is doing better he continues to have negative balance continue with the current management  2.  He is tolerating lisinopril 2.5 mg his blood pressure is acceptable we will continue the current medications I do not think be able to advance the dose is at the point  3.  We will follow at a distance please call if assistance is needed      Cece Escobedo MD   03/04/20 11:03 AM

## 2020-03-04 NOTE — THERAPY TREATMENT NOTE
Acute Care - Physical Therapy Treatment Note   Kushal     Patient Name: Rafael Hussein  : 1923  MRN: [id number]  Today's Date: 3/4/2020     Date of Referral to PT: 20       Admit Date: 2020    Visit Dx:    ICD-10-CM ICD-9-CM   1. Community acquired pneumonia of right lower lobe of lung (CMS/Formerly [facility]) J18.1 481   2. Acute respiratory failure with hypoxia (CMS/[facility]ly [facility]) J96.01 518.81   3. Sepsis with acute hypoxic respiratory failure, due to unspecified organism, unspecified whether septic shock present (CMS/[facility]) A41.9 038.9    R65.20 995.91    J96.01 518.81     Patient Active Problem List   Diagnosis   • Community acquired pneumonia of right lower lobe of lung (CMS/[facility])   • Normocytic anemia   • Former smoker   • Advanced age   • Hypoalbuminemia   • Lactic acidosis   • Elevated brain natriuretic peptide (BNP) level   • Elevated troponin   • LBBB (left bundle branch block)   • History of abdominal aortic aneurysm (AAA)   • Black lung (CMS/[facility])   • Macular degeneration   • Emphysema lung (CMS/[facility])       Therapy Treatment    Rehabilitation Treatment Summary     Row Name 20 1500             Treatment Time/Intention    Discipline  physical therapist  -BC      Document Type  evaluation  -BC      Mode of Treatment  physical therapy  -BC      Patient Effort  poor  -BC      Recorded by [BC] Mili Vasquez, PT 20 1510      Row Name 20 1500             Cognitive Assessment/Intervention- PT/OT    Follows Commands (Cognition)  does not follow one step commands  -BC      Recorded by [BC] Mili Vasquez, PT 20 1510      Row Name 20 1500             Mobility Assessment/Intervention    Extremity Weight-bearing Status  left lower extremity;right lower extremity  -BC      Left Lower Extremity (Weight-bearing Status)  full weight-bearing (FWB)  -BC      Right Lower Extremity (Weight-bearing Status)  full weight-bearing (FWB)  -BC      Recorded by [BC] Mili Vasquez, PT 20 1510       Row Name 03/04/20 1500             Bed Mobility Assessment/Treatment    Bed Mobility Assessment/Treatment  bed mobility (all) activities  -BC      [place] Level (Bed Mobility)  moderate assist (50% patient effort)  -BC      Bed Mobility, Safety Issues  decreased use of arms for pushing/pulling;decreased use of legs for bridging/pushing  -BC      Assistive Device (Bed Mobility)  bed rails  -BC      Recorded by [BC] Mili Vasquez, PT 03/04/20 1510      Row Name 03/04/20 1500             Transfer Assessment/Treatment    Transfer Assessment/Treatment  sit-stand transfer;stand-sit transfer  -BC      Recorded by [BC] Mili Vasquez, PT 03/04/20 1510      Row Name 03/04/20 1500             Sit-Stand Transfer    Sit-Stand [place] (Transfers)  moderate assist (50% patient effort)  -BC      Assistive Device (Sit-Stand Transfers)  walker, front-wheeled  -BC      Recorded by [BC] Mili Vasquez, PT 03/04/20 1510      Row Name 03/04/20 1500             Stand-Sit Transfer    Stand-Sit [place] (Transfers)  moderate assist (50% patient effort)  -BC      Assistive Device (Stand-Sit Transfers)  walker, front-wheeled  -BC      Recorded by [BC] Mili Vasquez, PT 03/04/20 1510      Row Name 03/04/20 1500             Gait/Stairs Assessment/Training    Gait/Stairs Assessment/Training  gait/ambulation independence  -BC      [place] Level (Gait)  moderate assist (50% patient effort)  -BC      Assistive Device (Gait)  walker, front-wheeled  -BC      Distance in Feet (Gait)  60 ft  -BC      Recorded by [BC] Mili Vasquez, PT 03/04/20 1510      Row Name 03/04/20 1500             Positioning and Restraints    Pre-Treatment Position  sitting in chair/recliner  -BC      Post Treatment Position  bed  -BC      In Bed  notified nsg;call light within reach;encouraged to call for assist;with family/caregiver;side rails up x3  -BC      Recorded by [BC] Mili Vasquez, PT 03/04/20 1510      Row Name 03/04/20  1500             Coping    Observed Emotional State  accepting;calm;cooperative  -BC      Verbalized Emotional State  acceptance  -BC      Recorded by [BC] Mili Vasquez, PT 03/04/20 1510      Row Name 03/04/20 1500             Plan of Care Review    Plan of Care Reviewed With  patient  -BC      Recorded by [BC] Mili Vasquez, PT 03/04/20 1510        User Key  (r) = Recorded By, (t) = Taken By, (c) = Cosigned By    Initials Name Effective Dates Discipline    BC Mili Vasquez, PT 03/14/16 -  PT                   Physical Therapy Education                 Title: PT OT SLP Therapies (In Progress)     Topic: Physical Therapy (In Progress)     Point: Mobility training (In Progress)     Description:   Instruct learner(s) on safety and technique for assisting patient out of bed, chair or wheelchair.  Instruct in the proper use of assistive devices, such as walker, crutches, cane or brace.              Patient Friendly Description:   It's important to get you on your feet again, but we need to do so in a way that is safe for you. Falling has serious consequences, and your personal safety is the most important thing of all.        When it's time to get out of bed, one of us or a family member will sit next to you on the bed to give you support.     If your doctor or nurse tells you to use a walker, crutches, a cane, or a brace, be sure you use it every time you get out of bed, even if you think you don't need it.    Learning Progress Summary           Patient Acceptance, E, NR by BC at 3/3/2020 1649                   Point: Home exercise program (In Progress)     Description:   Instruct learner(s) on appropriate technique for monitoring, assisting and/or progressing patient with therapeutic exercises and activities.              Learning Progress Summary           Patient Acceptance, E, NR by BC at 3/3/2020 1649                   Point: Body mechanics (In Progress)     Description:   Instruct learner(s) on proper  positioning and spine alignment for patient and/or caregiver during mobility tasks and/or exercises.              Learning Progress Summary           Patient Acceptance, E, NR by BC at 3/3/2020 1649                   Point: Precautions (In Progress)     Description:   Instruct learner(s) on prescribed precautions during mobility and gait tasks              Learning Progress Summary           Patient Acceptance, E, NR by BC at 3/3/2020 1649                               User Key     Initials Effective Dates Name Provider Type [facility] 03/14/16 -  Mili Vasquez PT Physical Therapist PT                PT Recommendation and Plan     Plan of Care Reviewed With: patient     Time Calculation:   PT Charges     Row Name 03/04/20 1510             Time Calculation    PT Received On  03/04/20  -BC         Time Calculation- PT    Total Timed Code Minutes- PT  30 minute(s)  -BC         Timed Charges    12543 - Gait Training Minutes   15  -BC      52655 - PT Therapeutic Activity Minutes  15  -BC        User Key  (r) = Recorded By, (t) = Taken By, (c) = Cosigned By    Initials Name Provider Type    BC Mili Vasquez, PT Physical Therapist        Therapy Charges for Today     Code Description Service Date Service Provider Modifiers Qty    86699836350 HC GAIT TRAINING EA 15 MIN 3/3/2020 Mili Vasquez, PT GP 1    15802302000 HC PT EVAL MOD COMPLEXITY 3 3/3/2020 Mili Vasquez, PT GP 1    05148019598 HC GAIT TRAINING EA 15 MIN 3/4/2020 Mili Vasquez, PT GP 1    50615766026 HC PT THERAPEUTIC ACT EA 15 MIN 3/4/2020 Mili Vasquez, PT GP 1               Mili Vasquez PT  3/4/2020

## 2020-03-04 NOTE — PROGRESS NOTES
Malnutrition Severity Assessment    Patient Name:  Rafael Hussein  YOB: 1923  MRN: [id number]  Admit Date:  2/29/2020         Comments:  Please review and attest if agree with assessment. Thank you for your consult, will cont to follow.     Malnutrition Severity Assessment  Malnutrition Type: Chronic Disease - Related Malnutrition     Malnutrition Type (last 8 hours)      Malnutrition Severity Assessment     Row Name 03/04/20 1057       Malnutrition Severity Assessment    Malnutrition Type  Chronic Disease - Related Malnutrition    Row Name 03/04/20 1057       Muscle Loss    Loss of Muscle Mass Findings  Severe    [place]  Severe - deep hollowing/scooping, lack of muscle to touch, facial bones well defined    Clavicle Bone Region  Severe - protruding prominent bone    Acromion Bone Region  Severe - squared shoulders, bones, and acromion process protrusion prominent    Scapular Bone Region  Moderate - mild depression, bones may show slightly    Dorsal Hand Region  Severe - prominent depression    Posterior Calf Region  Severe - thin with very little definition/firmness    Row Name 03/04/20 1057       Fat Loss    Subcutaneous Fat Loss Findings  Moderate    Orbital Region   Severe - pronounced hollowness/depression, dark circles, loose saggy skin    Upper Arm Region  Moderate - some fat tissue, not ample    Thoracic & Lumbar Region  Moderate - ribs visible with mild depressions, iliac crest somewhat prominent          Electronically signed by:  Ashli Lambert RD  03/04/20 11:05 AM

## 2020-03-04 NOTE — PLAN OF CARE
MBS completed. Fleeting laryngeal penetration w/ thin liquids only. Whole barium pill does become lodged in the UES, requires puree presentation x3 to pass the UES. Will continue regular diet consistency w/ thin liquids.    Problem: Patient Care Overview  Goal: Plan of Care Review  Outcome: Ongoing (interventions implemented as appropriate)  Flowsheets (Taken 3/4/2020 0158 by Jennifer Fermin RN)  Plan of Care Reviewed With: patient

## 2020-03-04 NOTE — PLAN OF CARE
Pt sitting up in chair with multiple family members at the bedside. In no distress. Room air. Tele monitor on. Pt bathed and bed changed today. No complaints will continue to monitor

## 2020-03-04 NOTE — MBS/VFSS/FEES
Acute Care - Speech Language Pathology   Swallow Initial Evaluation  Kushal   MODIFIED BARIUM SWALLOW STUDY     Patient Name: Rafael Hussein  : 1923  MRN: [id number]  Today's Date: 3/4/2020      Admit Date: 2020    Rafael Hussein  presents to the radiology suite this am from 3306/2S to participate in an instrumental MBS to assess safety/efficacy of swallowing fnx, determine safest/least restrictive diet. Pt has significant h/o PNA of RLL, anemia, lactic acidosis, elevated BNP, elevated troponin, LBBB, AAA, black lung, macular degeneration, emphysema. Pt is currently admitted w/ community acquired PNA of RLL. He is accompanied by his family member to the radiology suite.        Social History     Socioeconomic History   • Marital status:      Spouse name: Not on file   • Number of children: Not on file   • Years of education: Not on file   • Highest education level: Not on file   Tobacco Use   • Smoking status: Former Smoker     Types: Cigarettes, Pipe     Last attempt to quit: 1989     Years since quittin.0   • Smokeless tobacco: Never Used   Substance and Sexual Activity   • Alcohol use: Never     Frequency: Never   • Drug use: Never   • Sexual activity: Defer        CHEST IMAGING:  CR Chest 1 Vw 3/3/2020     INDICATION:   Follow-up congestive heart failure and lobar pneumonia. Acute respiratory failure with hypoxia today.      COMPARISON:    2020     FINDINGS:  Single portable AP view(s) of the chest.     No visible support lines.     The cardiac size is stable. Interval decrease in pulmonary edema. No airspace consolidation. There are no pleural effusions. No pneumothorax.      IMPRESSION:  Decrease in pulmonary edema compared with 2020.     Signer Name: Arnoldo Bo MD   Signed: 3/3/2020 7:40 AM   Workstation Name: [id number]    [facility]    Diet Orders (active) (From admission, onward)     Start     Ordered    20 1307  Diet Regular;  Daily Fluid Restriction; 1500 mL Fluid Per Day  Diet Effective Now      03/01/20 1306                Mr. Hussein is observed on ra w/o complications, tolerating well.    Risks and benefits of the procedure are explained w/ verbalizing understanding/agreement to participate. Proceed per protocol.    Pt is positioned upright and centered in a soft strap supportive chair to accept multiple po presentations of solid cracker, puree, honey thick, nectar thick, and thin liquids via spoon, cup and straw, along w/ whole placebo pill in puree. Pt is able to self feed.     All views are from the lateral plane.     Facial/oral structures are symmetrical upon observation w/o lingual deviation upon protrusion. Oral mucosa are moist, pink and clean. Secretions are clear, thin and well controlled. OROM/JAYESH is wfl to imitate oral postures. Gag is not assessed. Volitional cough is adequate in intensity, clear in quality, nonproductive. Vocal quality is adequate in intensity, clear in quality w/ intelligible speech. Pt is a/a and cooperative to particpate.  Pt  is oriented to person, place, and time, follows simple directives, and participates in simple conversational exchanges.     Upon po presentations, adequate bolus anticipation w/ good labial seal for bolus clearance via spoon bowl, cup rim stability and suction via straw.  Bolus formation, manipulation,  and control are wfl w/ rotary mastication pattern. A-p transit is timely w/o oral residue. Tongue base retraction and linguavelar seal are adequate w/o premature spillage. No laryngeal penetration or aspiration is evidenced before the swallow.     Pharyngeal swallow is timely w/ adequate hyolaryngeal elevation and epiglottic inversion. Pharyngeal contraction is mildly impaired w/mild diffuse pharyngeal residue. Fleeting laryngeal penetration w/ thin liquids via cup and straw, however clears upon completion of swallow. No other laryngeal penetration or aspiration evidenced during  or after the swallow.     Full esophageal sweep reveals no obvious mucosal abnormalities. Motility is wfl w/o retrograde flow noted.      Pt is able to manipulate and swallow whole barium pill, however whole barium pill becomes lodged at the level of the UES. Pt requires multiple presentation of puree presentation x3 to eventually pass through the UES.        Visit Dx:     ICD-10-CM ICD-9-CM   1. Community acquired pneumonia of right lower lobe of lung (CMS/HCC) J18.1 481   2. Acute respiratory failure with hypoxia (CMS/[facility]) J96.01 518.81   3. Sepsis with acute hypoxic respiratory failure, due to unspecified organism, unspecified whether septic shock present (CMS/[facility]) A41.9 038.9    R65.20 995.91    J96.01 518.81     Patient Active Problem List   Diagnosis   • Community acquired pneumonia of right lower lobe of lung (CMS/HCC)   • Normocytic anemia   • Former smoker   • Advanced age   • Hypoalbuminemia   • Lactic acidosis   • Elevated brain natriuretic peptide (BNP) level   • Elevated troponin   • LBBB (left bundle branch block)   • History of abdominal aortic aneurysm (AAA)   • Black lung (CMS/HCC)   • Macular degeneration   • Emphysema lung (CMS/HCC)     Past Medical History:   Diagnosis Date   • Aortic aneurysm (CMS/HCC)    • Black lung (CMS/HCC)    • Emphysema lung (CMS/HCC) 2/29/2020   • History of abdominal aortic aneurysm (AAA) 2/29/2020   • Macular degeneration 2/29/2020     Past Surgical History:   Procedure Laterality Date   • ABDOMINAL AORTIC ANEURYSM REPAIR     • CARDIAC CATHETERIZATION      x3          EDUCATION  The patient has been educated in the following areas:   Dysphagia (Swallowing Impairment) Oral Care/Hydration.    SLP Recommendation and Plan    Impression: Per this evaluation, Mr. Hussein presents w/ a wfl oropharyngeal swallowing fnx. Mild diffuse pharyngeal residue. Fleeting laryngeal penetration w/ thin liquids via cup and straw however clears upon completion of swallow. No other laryngeal  penetration or aspiration evidenced during or after the swallow.     He is felt to most benefit from continued regular diet consistency w/ thin liquids. Meds whole in puree/thins. Upright and centered for all po intake.    Recommendations:   1. Regular diet consistency w/ thin liquids.  2. Meds whole in puree/thins.   3. [place] precautions.   4. Oral care protocol.   5. Upright and centered for all po intake    No further SLP f/u warranted at this time.     D/w pt results and recommendations w/ verbal understanding and agreement.     D/w RN Jennifer results and recommendations w/ verbal understanding and agreement.     Thank you for allowing me to participate in the care of your patient-  Anuradha Mclaughlin M.S., CCC-SLP   Time Calculation:       Therapy Charges for Today     Code Description Service Date Service Provider Modifiers Qty    30921952985 HC ST MOTION FLUORO EVAL SWALLOW 8 3/4/2020 Anuradha Mclaughlin MS CCC-SLP GN 1               Anuradha Mclaughlin MS CCC-SLP  3/4/2020

## 2020-03-04 NOTE — PROGRESS NOTES
Patient Identification:  Name:  Rafael Hussein  Age:  89 y.o.  Sex:  male  :  1923  MRN:  [id number]  Visit Number:  [id number]  Primary Care Provider:  Rio Hardin APRN    Length of stay:  4    Subjective/Interval History/Consultants/Procedures     Chief complaint: Follow-up Sepsis, pneumonia, CHF      HPI:     Mr. Hussein is a 89-year-old  male with PMH significant for emphysema, AAA s/p repair, 's pneumoconiosis, and CAD.  He presented to the ED of this facility on 2020 for further evaluation of chest discomfort and shortness of breath.  He reported noted dyspnea.  He was found to have acute hypoxic respiratory failure with sepsis 2/2 bibasilar community acquired pneumonia in addition to suspected CHF.        Hospital course:    Mr. Hussein was started on IV ceftriaxone and doxycycline. He initially received IV fluids in the ED given initial blood pressure of 82/38 with improved hemodynamics following fluid administration.  CT chest without contrast revealed moderate bilateral pleural effusions, right> left with bibasilar atelectasis or infiltrates with smaller patchy infiltrates seen in the upper lobes bilaterally, left>right.  Respiratory PCR was unremarkable.  Blood cultures without growth to date.     Given elevated proBNP in the 6000s and concern for underlying CHF on CXR, echocardiogram was ordered.  Echocardiogram revealed moderately-to-severely dilated left ventricular cavity and grade III diastolic dysfunction with moderate aortic valve regurgitation, moderate mitral valve regurgitation, right atrium dilatation, left atrial cavity severely dilated, and EF estimated to be less than 20%.  Cardiology consultation was placed during admission with initial evaluation by Dr. Philip Forrest on 3/1/2020.  He was started on Entresto and Coreg in addition to Torsemide.   He did have a slight rise in creatinine and bump in BUN following with Torsemide held and Entresto and  Coreg continued; however, Entresto was discontinued on 3/3/2020 following hypotension with Dr. Cece Escobedo on board for Cardiology.            Subjective:      Mr. Hussein is evaluated sitting up on the side of his bed eating breakfast. He reports he did have some coughing and choking when eating a biscuit but reports he does not think he had his teeth in but now has them in. He denies dyspnea or lower extremity edema. We discuss plan to have SLP william.  During my evaluation,  He puts his legs on the bed and scoots himself up in the bed without assistance (though assistance was offered).     Discussed with AM YUSUF Rodriguez with no known acute events overnight. Room location at the time of evaluation was [address].  ----------------------------------------------------------------------------------------------------------------------  Current [facility] Meds:    aspirin 81 mg Oral Daily   atorvastatin 40 mg Oral Nightly   carvedilol 3.125 mg Oral BID With Meals   cefdinir 300 mg Oral Q12H   famotidine 20 mg Oral Daily   guaiFENesin 600 mg Oral Q12H   heparin (porcine) 5,000 Units Subcutaneous Q8H   lactobacillus acidophilus 1 capsule Oral Daily   lisinopril 2.5 mg Oral Q24H   multivitamin 1 tablet Oral Daily   sodium chloride 10 mL Intravenous Q12H        ----------------------------------------------------------------------------------------------------------------------      Objective     Vital Signs:  Temp:  [97.3 °F (36.3 °C)-97.9 °F (36.6 °C)] 97.9 °F (36.6 °C)  Heart Rate:  [65-74] 74  Resp:  [18] 18  BP: (101-112)/(44-59) 104/53      03/01/20  0500 03/02/20  0500 03/04/20  0500   Weight: 65.5 kg (144 lb 4.8 oz) 64.9 kg (143 lb) 64 kg (141 lb)     Body mass index is 21.44 kg/m².    Intake/Output Summary (Last 24 hours) at 3/4/2020 0846  Last data filed at 3/4/2020 0200  Gross per 24 hour   Intake 360 ml   Output 900 ml   Net -540 ml     No intake/output data recorded.  Diet Regular; Daily Fluid Restriction; 1500 mL Fluid  Per Day  ----------------------------------------------------------------------------------------------------------------------    Physical Exam   Constitutional: He is oriented to person, place, and time. He appears well-developed and well-nourished. No distress.   HENT:   Head: Normocephalic and atraumatic.   Eyes: Pupils are equal, round, and reactive to light. EOM are normal.   Neck: Normal range of motion.   Cardiovascular: Normal rate and regular rhythm.   Pulmonary/Chest: Breath sounds normal. No respiratory distress. He has no wheezes. He has no rales. He exhibits no tenderness.   Abdominal: Soft. Bowel sounds are normal. He exhibits no distension. There is no tenderness.   Musculoskeletal: He exhibits no edema or deformity.   Neurological: He is alert and oriented to person, place, and time.   Skin: Skin is warm and dry. He is not diaphoretic.   Psychiatric: He has a normal mood and affect. His behavior is normal.   Nursing note and vitals reviewed.         ----------------------------------------------------------------------------------------------------------------------  Tele:      Continues to refuse telemetry.     ----------------------------------------------------------------------------------------------------------------------  Results from last 7 days   Lab Units 03/04/20 0452 03/03/20 0441 03/02/20  0548 03/01/20  1316 03/01/20  0725 03/01/20  0136  02/29/20  0845   TROPONIN T ng/mL  --   --   --  0.066* 0.056* 0.063*   < > 0.020   PROBNP pg/mL 2,045.0* 4,983.0* 16,421.0*  --   --  7,741.0*  --  5,965.0*    < > = values in this interval not displayed.     Results from last 7 days   Lab Units 03/04/20 0452 03/03/20 0441 03/03/20  0440 03/02/20  0548  02/29/20  1237 02/29/20  0845   CRP mg/dL  --  2.65*  --  3.58*  --   --   --    LACTATE mmol/L  --   --   --   --   --  2.4* 3.3*   WBC 10*3/mm3 11.22*  --  13.33* 17.41*   < >  --  12.35*   HEMOGLOBIN g/dL 12.2*  --  13.4 12.9*   < >  --  11.7*    HEMATOCRIT % 40.6  --  45.1 41.1   < >  --  39.1   MCV fL 92.7  --  93.4 90.1   < >  --  94.2   MCHC g/dL 30.0*  --  29.7* 31.4*   < >  --  29.9*   PLATELETS 10*3/mm3 198  --  244 236   < >  --  182    < > = values in this interval not displayed.     Results from last 7 days   Lab Units 02/29/20  2330   PH, ARTERIAL pH units 7.346*   PO2 ART mm Hg 69.6*   PCO2, ARTERIAL mm Hg 41.4   HCO3 ART mmol/L 22.6     Results from last 7 days   Lab Units 03/04/20  0452 03/03/20  0441 03/02/20  0548  02/29/20  0845   SODIUM mmol/L 144 141 139   < > 141   POTASSIUM mmol/L 3.9 3.8 3.3*   < > 3.7   MAGNESIUM mg/dL 2.0 2.2 2.1   < >  --    CHLORIDE mmol/L 110* 106 103   < > 105   CO2 mmol/L 22.0 21.9* 24.6   < > 22.0   BUN mg/dL 30* 42* 27*   < > 18   CREATININE mg/dL 0.89 1.40* 1.17   < > 0.96   EGFR IF NONAFRICN AM mL/min/1.73 79 47* 58*   < > 73   CALCIUM mg/dL 7.9* 8.6 8.4   < > 8.6   GLUCOSE mg/dL 117* 105* 110*   < > 187*   ALBUMIN g/dL 2.89*  --  3.00*  --  3.21*   BILIRUBIN mg/dL 0.3  --  0.4  --  0.4   ALK PHOS U/L 77  --  86  --  90   AST (SGOT) U/L 23  --  31  --  22   ALT (SGPT) U/L 19  --  23  --  18    < > = values in this interval not displayed.   Estimated Creatinine Clearance: 43.9 mL/min (by C-G formula based on SCr of 0.89 mg/dL).  No results found for: AMMONIA      Blood Culture   Date Value Ref Range Status   02/29/2020 No growth at 3 days  Preliminary   02/29/2020 No growth at 3 days  Preliminary     No results found for: URINECX  No results found for: WOUNDCX  No results found for: STOOLCX  ----------------------------------------------------------------------------------------------------------------------  Imaging Results (Last 24 Hours)     ** No results found for the last 24 hours. **          Microbiology Results (last 10 days)     Procedure Component Value - Date/Time    Mycoplasma Pneumoniae Antibody, IgM - Blood, Arm, Left [[id number]]  (Normal) Collected:  02/29/20 4656    Lab Status:  Final result  Specimen:  Blood from Arm, Left Updated:  02/29/20 1816     Mycoplasma pneumo IgM Negative    Respiratory Panel, PCR - Swab, Nasopharynx [[id number]]  (Normal) Collected:  02/29/20 1712    Lab Status:  Final result Specimen:  Swab from Nasopharynx Updated:  02/29/20 1839     ADENOVIRUS, PCR Not Detected     Coronavirus 229E Not Detected     Coronavirus HKU1 Not Detected     Coronavirus NL63 Not Detected     Coronavirus OC43 Not Detected     Human Metapneumovirus Not Detected     Human Rhinovirus/Enterovirus Not Detected     Influenza B PCR Not Detected     Parainfluenza Virus 1 Not Detected     Parainfluenza Virus 2 Not Detected     Parainfluenza Virus 3 Not Detected     Parainfluenza Virus 4 Not Detected     Bordetella pertussis pcr Not Detected     Influenza A H1 2009 PCR Not Detected     Chlamydophila pneumoniae PCR Not Detected     Mycoplasma pneumo by PCR Not Detected     Influenza A PCR Not Detected     Influenza A H3 Not Detected     Influenza A H1 Not Detected     RSV, PCR Not Detected    Blood Culture - Blood, Arm, Right [[id number]] Collected:  02/29/20 0910    Lab Status:  Preliminary result Specimen:  Blood from Arm, Right Updated:  03/03/20 1030     Blood Culture No growth at 3 days    Blood Culture - Blood, Arm, Right [[id number]] Collected:  02/29/20 0845    Lab Status:  Preliminary result Specimen:  Blood from Arm, Right Updated:  03/03/20 1030     Blood Culture No growth at 3 days          ----------------------------------------------------------------------------------------------------------------------   I have reviewed the above laboratory values for 03/04/20    Assessment/Plan     Active Hospital Problems    Diagnosis POA   • Community acquired pneumonia of right lower lobe of lung (CMS/HCC) [J18.1] Yes   • Normocytic anemia [D64.9] Yes   • Former smoker [Z87.891] Not Applicable   • Advanced age [R54] Yes   • Hypoalbuminemia [E88.09] Yes   • Lactic acidosis [E87.2] Yes   • Elevated brain  natriuretic peptide (BNP) level [R79.89] Yes   • Elevated troponin [R79.89] Yes   • LBBB (left bundle branch block) [I44.7] Yes   • History of abdominal aortic aneurysm (AAA) [Z86.79] Not Applicable   • Black lung (CMS/HCC) [J60] Yes   • Macular degeneration [H35.30] Yes   • Emphysema lung (CMS/[facility]) [J43.9] Yes         ASSESSMENT/PLAN:  · Acute combined diastolic/systolic CHF exacerbation with severe ischemic cardiomyopathy with grade III diastolic dysfunction: Continue Coreg.  Cardiology input appreciated.  Low dose ACEI 2.5mg Lisinopril started with hold parameter for SBP less than 100. Will monitor closely with said parameter.   Continues to refuse telemetry; will attempt today to get Mr. Hussein to wear telemetry. ProBNP improving.  CXR 3/3 improving.     · Sepsis, present on admission, 2/2 bibasilar community acquired pneumonia:  IV Rocephin transitioned to oral omnicef.  SLP evaluation has been added. WBC improved and nearly normalized. C-RP previously on downward trend.     · Acute hypoxic respiratory failure: Orders placed to titrate oxygen saturation greater than or equal to 90%.      · CARLEEN, improved: Continue to hold nephrotoxic agents when possible.  Repeat BMP in AM. Creatinine improved today at 0.89.    · Elevated troponin in setting of CAD with reported prior stenting: Continue ASA & statin. Cardiology on board.  Coreg on board with blood pressures improved on 3/4.    · Acute hypokalemia, improved: Continue repeat BMP.     · Normocytic anemia: Continue to monitor daily CBC.     · 's pneumoconiosis and emphysema: No acute exacerbation. Continue to monitor.     · History of AAA s/p repair    · Malnutrition: RD consult placed for assessment.     · Advanced age with related debility:  PT/OT consulted during admission. Moderate assist with full weight bearing status and use of front-wheeled walker.       -----------  -DVT prophylaxis: SQ Heparin  -GI prophylaxis: Pepcid qd  -Activity:  Up with  assist  -Disposition: remains hospitalized for closer monitoring as CHF meds are adjusted with close monitoring of blood pressures and BMP  -Diet: Regular, 1500mL fluid restriction  -Home supplemental O2: N/A      The patient is high risk due to the following diagnoses/reasons:  Acute combined systolic & diastolic CHF, Advanced age    Elva Hickman PA-C  03/04/20  8:46 AM  Pager # [phone]

## 2020-03-05 VITALS
BODY MASS INDEX: 21.2 KG/M2 | OXYGEN SATURATION: 97 % | HEIGHT: 68 IN | DIASTOLIC BLOOD PRESSURE: 48 MMHG | WEIGHT: 139.9 LBS | HEART RATE: 75 BPM | RESPIRATION RATE: 18 BRPM | TEMPERATURE: 98.4 F | SYSTOLIC BLOOD PRESSURE: 96 MMHG

## 2020-03-05 LAB
ANION GAP SERPL CALCULATED.3IONS-SCNC: 8.1 MMOL/L (ref 5–15)
BACTERIA SPEC AEROBE CULT: NORMAL
BACTERIA SPEC AEROBE CULT: NORMAL
BASOPHILS # BLD AUTO: 0.06 10*3/MM3 (ref 0–0.2)
BASOPHILS NFR BLD AUTO: 0.6 % (ref 0–1.5)
BUN BLD-MCNC: 24 MG/DL (ref 8–23)
BUN/CREAT SERPL: 28.6 (ref 7–25)
CALCIUM SPEC-SCNC: 8.2 MG/DL (ref 8.2–9.6)
CHLORIDE SERPL-SCNC: 102 MMOL/L (ref 98–107)
CO2 SERPL-SCNC: 22.9 MMOL/L (ref 22–29)
CREAT BLD-MCNC: 0.84 MG/DL (ref 0.76–1.27)
DEPRECATED RDW RBC AUTO: 50.3 FL (ref 37–54)
EOSINOPHIL # BLD AUTO: 0.26 10*3/MM3 (ref 0–0.4)
EOSINOPHIL NFR BLD AUTO: 2.8 % (ref 0.3–6.2)
ERYTHROCYTE [DISTWIDTH] IN BLOOD BY AUTOMATED COUNT: 14.6 % (ref 12.3–15.4)
GFR SERPL CREATININE-BSD FRML MDRD: 85 ML/MIN/1.73
GLUCOSE BLD-MCNC: 105 MG/DL (ref 65–99)
HCT VFR BLD AUTO: 39.5 % (ref 37.5–51)
HGB BLD-MCNC: 11.8 G/DL (ref 13–17.7)
IMM GRANULOCYTES # BLD AUTO: 0.1 10*3/MM3 (ref 0–0.05)
IMM GRANULOCYTES NFR BLD AUTO: 1.1 % (ref 0–0.5)
LYMPHOCYTES # BLD AUTO: 1.32 10*3/MM3 (ref 0.7–3.1)
LYMPHOCYTES NFR BLD AUTO: 14 % (ref 19.6–45.3)
MCH RBC QN AUTO: 27.9 PG (ref 26.6–33)
MCHC RBC AUTO-ENTMCNC: 29.9 G/DL (ref 31.5–35.7)
MCV RBC AUTO: 93.4 FL (ref 79–97)
MONOCYTES # BLD AUTO: 1.24 10*3/MM3 (ref 0.1–0.9)
MONOCYTES NFR BLD AUTO: 13.1 % (ref 5–12)
NEUTROPHILS # BLD AUTO: 6.46 10*3/MM3 (ref 1.7–7)
NEUTROPHILS NFR BLD AUTO: 68.4 % (ref 42.7–76)
NRBC BLD AUTO-RTO: 0 /100 WBC (ref 0–0.2)
PLATELET # BLD AUTO: 179 10*3/MM3 (ref 140–450)
PMV BLD AUTO: 9.6 FL (ref 6–12)
POTASSIUM BLD-SCNC: 3.4 MMOL/L (ref 3.5–5.2)
RBC # BLD AUTO: 4.23 10*6/MM3 (ref 4.14–5.8)
SODIUM BLD-SCNC: 133 MMOL/L (ref 136–145)
WBC NRBC COR # BLD: 9.44 10*3/MM3 (ref 3.4–10.8)

## 2020-03-05 PROCEDURE — 25010000002 HEPARIN (PORCINE) PER 1000 UNITS: Performed by: INTERNAL MEDICINE

## 2020-03-05 PROCEDURE — 97116 GAIT TRAINING THERAPY: CPT

## 2020-03-05 PROCEDURE — 94799 UNLISTED PULMONARY SVC/PX: CPT

## 2020-03-05 PROCEDURE — 99239 HOSP IP/OBS DSCHRG MGMT >30: CPT | Performed by: PHYSICIAN ASSISTANT

## 2020-03-05 PROCEDURE — 80048 BASIC METABOLIC PNL TOTAL CA: CPT | Performed by: PHYSICIAN ASSISTANT

## 2020-03-05 PROCEDURE — 97530 THERAPEUTIC ACTIVITIES: CPT

## 2020-03-05 PROCEDURE — 85025 COMPLETE CBC W/AUTO DIFF WBC: CPT | Performed by: PHYSICIAN ASSISTANT

## 2020-03-05 RX ORDER — LISINOPRIL 2.5 MG/1
2.5 TABLET ORAL
Qty: 30 TABLET | Refills: 0 | Status: SHIPPED | OUTPATIENT
Start: 2020-03-06 | End: 2020-04-05

## 2020-03-05 RX ORDER — CEFDINIR 300 MG/1
300 CAPSULE ORAL EVERY 12 HOURS SCHEDULED
Qty: 9 CAPSULE | Refills: 0 | Status: SHIPPED | OUTPATIENT
Start: 2020-03-05 | End: 2020-03-10

## 2020-03-05 RX ORDER — L.ACID,PARA/B.BIFIDUM/S.THERM 8B CELL
1 CAPSULE ORAL DAILY
Qty: 5 CAPSULE | Refills: 0 | Status: SHIPPED | OUTPATIENT
Start: 2020-03-06 | End: 2020-03-11

## 2020-03-05 RX ORDER — FAMOTIDINE 20 MG/1
20 TABLET, FILM COATED ORAL DAILY
Qty: 30 TABLET | Refills: 0 | Status: SHIPPED | OUTPATIENT
Start: 2020-03-06 | End: 2020-04-05

## 2020-03-05 RX ORDER — MULTIVITAMIN
1 TABLET ORAL DAILY
Qty: 30 TABLET | Refills: 0 | Status: SHIPPED | OUTPATIENT
Start: 2020-03-06 | End: 2020-04-05

## 2020-03-05 RX ORDER — ASPIRIN 81 MG/1
81 TABLET, CHEWABLE ORAL DAILY
Qty: 30 TABLET | Refills: 0 | Status: SHIPPED | OUTPATIENT
Start: 2020-03-06 | End: 2020-04-05

## 2020-03-05 RX ORDER — POTASSIUM CHLORIDE 20 MEQ/1
40 TABLET, EXTENDED RELEASE ORAL DAILY
Status: DISCONTINUED | OUTPATIENT
Start: 2020-03-05 | End: 2020-03-05 | Stop reason: HOSPADM

## 2020-03-05 RX ORDER — CARVEDILOL 3.12 MG/1
3.12 TABLET ORAL 2 TIMES DAILY WITH MEALS
Qty: 60 TABLET | Refills: 0 | Status: SHIPPED | OUTPATIENT
Start: 2020-03-05 | End: 2020-04-04

## 2020-03-05 RX ADMIN — ASPIRIN 81 MG: 81 TABLET, CHEWABLE ORAL at 08:27

## 2020-03-05 RX ADMIN — POTASSIUM CHLORIDE 40 MEQ: 20 TABLET, EXTENDED RELEASE ORAL at 10:26

## 2020-03-05 RX ADMIN — CARVEDILOL 3.12 MG: 3.12 TABLET, FILM COATED ORAL at 08:27

## 2020-03-05 RX ADMIN — LISINOPRIL 2.5 MG: 2.5 TABLET ORAL at 08:27

## 2020-03-05 RX ADMIN — HEPARIN SODIUM 5000 UNITS: 5000 INJECTION INTRAVENOUS; SUBCUTANEOUS at 05:29

## 2020-03-05 RX ADMIN — GUAIFENESIN 600 MG: 600 TABLET, EXTENDED RELEASE ORAL at 08:27

## 2020-03-05 RX ADMIN — CEFDINIR 300 MG: 300 CAPSULE ORAL at 08:27

## 2020-03-05 RX ADMIN — Medication 1 CAPSULE: at 08:27

## 2020-03-05 RX ADMIN — FAMOTIDINE 20 MG: 20 TABLET, FILM COATED ORAL at 08:27

## 2020-03-05 RX ADMIN — Medication 1 TABLET: at 08:27

## 2020-03-05 NOTE — PROGRESS NOTES
Discharge Planning Assessment   [facility]     Patient Name: Rafael Hussein  MRN: [id number]  Today's Date: 3/5/2020    Admit Date: 2/29/2020    Discharge Plan     Row Name 03/05/20 0857       Plan    Plan  SS spoke with pt regarding possible short term nursing home for rehab.  Pt is not agreeable at this time.  Pt and pt's family are agreeable for home health referral.  Pt family requests hospital bed at discharge.  SS provided pt family with contact numbers for [facility] for assistance with home care through [facility].  SS will follow.         DANIEL SalasW

## 2020-03-05 NOTE — PROGRESS NOTES
Nutrition Services    Patient Name:  Rafael Hussein  YOB: 1923  MRN: [id number]  Admit Date:  2/29/2020    RD adding Magic cup BID to increase overall kcal/protein intake.     Electronically signed by:  Ashli Lambert RD  03/05/20 11:02 AM

## 2020-03-05 NOTE — DISCHARGE PLACEMENT REQUEST
"Tina Raya (89 y.o. Male)     Date of Birth Social Security Number Address Home Phone MRN    04/28/1923  [address]   [place] [address] [phone] [id number]    Christianity Marital Status          None        Admission Date Admission Type Admitting Provider Attending Provider Department, Room/Bed    2/29/20 Emergency Karthik Hernandez MD Heinss, Karl F, MD [address], 3306/2S    Discharge Date Discharge Disposition Discharge Destination         Home or Self Care              Attending Provider:  Quentin Oliveros MD    Allergies:  Penicillins    Isolation:  None   Infection:  None   Code Status:  CPR    Ht:  172.7 cm (67.99\")   Wt:  63.5 kg (139 lb 14.4 oz)    Admission Cmt:  None   Principal Problem:  None                Active Insurance as of 2/29/2020     Primary Coverage     Payor Plan Insurance Group Employer/Plan Group    MEDICARE MEDICARE A & B      Payor Plan Address Payor Plan Phone Number Payor Plan Fax Number Effective Dates    [address] [phone]  1/1/1974 - None Entered    [place] [id number]       Subscriber Name Subscriber Birth Date Member ID       TINA RAYA 4/28/1923 [id number]                 Emergency Contacts      (Rel.) Home Phone Work Phone Mobile Phone    DARRION RAYA (Daughter) -- -- [phone]            Emergency Contact Information     Name Relation Home Work Mobile    DARRION RAYA Daughter   [phone]          Insurance Information                MEDICARE/MEDICARE A & B Phone: [phone]    Subscriber: Tina Raya Subscriber#: [id number]    Group#:  Precert#:           Treatment Team     Provider Relationship Specialty Contact    Quentin Oliveros MD Attending, Physician of Record Hospitalist [phone]    Elva Hickman PA-C Physician Assistant Physician Assistant [phone]    Mili Vasquez, PT Physical Therapist Physical Therapy     [facility] [place] --     Kate Harris, RRT " Respiratory Therapist -- [id number]    Paulo Auguste RN Registered Nurse --           Problem List           Codes Noted - Resolved       Hospital    Community acquired pneumonia of right lower lobe of lung (CMS/HCC) ICD-10-CM: J18.1  ICD-9-CM: 481 2/29/2020 - Present    Normocytic anemia ICD-10-CM: D64.9  ICD-9-CM: 285.9 2/29/2020 - Present    Former smoker (Chronic) ICD-10-CM: Z87.891  ICD-9-CM: V15.82 2/29/2020 - Present    Advanced age (Chronic) ICD-10-CM: R54  ICD-9-CM: 797 2/29/2020 - Present    Hypoalbuminemia ICD-10-CM: E88.09  ICD-9-CM: 273.8 2/29/2020 - Present    Lactic acidosis ICD-10-CM: E87.2  ICD-9-CM: 276.2 2/29/2020 - Present    Elevated brain natriuretic peptide (BNP) level ICD-10-CM: R79.89  ICD-9-CM: 790.99 2/29/2020 - Present    Elevated troponin ICD-10-CM: R79.89  ICD-9-CM: 790.6 2/29/2020 - Present    LBBB (left bundle branch block) ICD-10-CM: I44.7  ICD-9-CM: 426.3 2/29/2020 - Present    History of abdominal aortic aneurysm (AAA) (Chronic) ICD-10-CM: Z86.79  ICD-9-CM: V12.59 2/29/2020 - Present    Black lung (CMS/HCC) (Chronic) ICD-10-CM: J60  ICD-9-CM: 500 2/29/2020 - Present    Macular degeneration (Chronic) ICD-10-CM: H35.30  ICD-9-CM: 362.50 2/29/2020 - Present    Emphysema lung (CMS/HCC) (Chronic) ICD-10-CM: J43.9  ICD-9-CM: 492.8 2/29/2020 - Present             History & Physical      Ketty Reddy PA at 02/29/20 1527     Attestation signed by Karthik Hernandez MD at 02/29/20 1926 (Updated)    Mr. Hussein is our 89 yo M with hx of emphysema, AAA s/p repair, 's pneumoconiosis, CAD who presents with chest discomfort and shortness of breath. Patient noted dyspnea     Patient is a poor historian and has given different accounts. From the account he gave me, he reports getting three stents in the past and the shortness of breath and discomfort he is experiencing is similar to those episodes. He notes pain and dyspnea is exertional. He notes no history of heart failure but is on lasix  at ome for edema.     Patient denies any fevers/chills or cough.     Exam:  Constitutional: Elderly appearing male. No acute distress.   HENT: Mucosa moist. Neck supple.   Pulmonary: Rales in bases bilaterally. No wheezing.   Cardiac: RRR, no m/r/g  Abdominal: Soft. Bowel sounds are normal. He exhibits no distension and no mass. There is no guarding.   Musculoskeletal: Normal range of motion. He exhibits no edema.   Neurological: AAOX3. Poor short term memory. No focal neurological deficit.   Skin: Skin is warm and dry. No rashes.   Psychiatric: He has a normal mood and affect.       A/P:  #Acute hypoxic respiratory failure   #Sepsis 2/2 bibasilar community acquired pneumonia   #Suspected CHF  - ABG on presentation 7.42/35/53 on room air   - He was tachypnic hypoxic, and had leukocytosis at presentation with potential source being pneumonia   - Plain film of chest revealed bilateral airspace disease, fluid vs. Infection   - ProBNP elevated at 6k in setting of normal renal function supportive of heart failure  - Given fluid in ED with improved hemodynamics supportive of pneumonia   - May be multifactiorial   - Will continue ceftriaxone doxycyline. Will get procal and CT chest. Will get echo.   - Will hold further fluids/diuretics.     #NSEMI   #Hx of CAD?  #Exertional dyspnea   #LBBB of unclear chronicity   - Troponin trending up since admission 0.020=>0.039=>0.055  - EKG reviewed. LBBB but no ST depression or STEMI noted.   - Chest pain with typical features but patient a poor historian.  - Suspect Type II NSTEMI in setting of heart failure vs. Pneumonia   - Will start ASA/Statin. Will get echocardiogram as above.    - Will consult cardiology for further risk stratification.     #Normocytic anemia   - Unclear hemoglobin baseline. 11.7 on presentaion. No signs of bleeding.     #Hx of AAA s/p repair   - Hx not consistent with dissection. Continue to monitor.     #Malnutrition  - w/ hypoalbuminemia   - Will consult  "nutrition     #Advanced age and related debility   - PT/OT consulted     F: PO  E: Replace as needed   N: Cardiac     Dispo: Admit to tele.     Code status: Full. Will continue to discuss more when family is available. Patient has daughter he lives with.                          [facility] Medicine Services  HISTORY & PHYSICAL    Patient Identification:  Name:  Rafael Hussein  Age:  89 y.o.  Sex:  male  :  1923  MRN:  [id number]   Visit Number:  [id number]  Primary Care Physician:  Rio Hardin APRN     Subjective     Chief complaint:   Chief Complaint   Patient presents with   • Chest Pain     History of presenting illness:   Patient is a 89 y.o. male with past medical history significant for non-oxygen dependent emphysema, AAA status post repair in the past, black lung, ocular degeneration, and advanced age that presented to the [facility] emergency department for evaluation of chest pain shortness of breath.  It is of note that the patient is a poor historian, he lives with his daughter who is also a poor historian.  His daughter does aid in some history of presenting illness but is limited.  Thus, some history is taken per ED staff and chart review as well as nursing staff.  Per the patient, last night he developed \" heart pains\" for which he describes as dyspnea.  On detailed questioning, he denies any specific pain, no radiating pain or pressure in his chest.  He mainly describes dyspnea, worse with exertion.  Patient states that this morning he was getting up out of bed when he had acute onset again of the \" heart pains\".  Patient states that this point he yelled for his daughter to come and check on him, who then called EMS and brought the patient in for ED evaluation.  Patient states he has had episodes like this in the past but not as severe.  Upon further questioning, he does report having heart caths x3, unsure if he has had stents.  No history of open " heart surgery.  He has had AAA status post repair in the past.  Per the daughter at bedside, he follows with a Cornelio Darby with cardiology in [place] and she states that we can contact their office for any records as she is unsure of specific details.  Upon further questioning, patient's daughter does report that approximately 4 weeks ago he was diagnosed with pneumonia and was prescribed antibiotics.  Patient states that his cough and symptoms did improve, however over the past 24 to 48 hours he has again developed a productive cough with yellow/green sputum.  Denies any known fevers or chills.  He does not wear home oxygen.  He is on Lasix at home, daughter states this is for swelling of his feet and legs.    Upon arrival to the ED, vitals were temperature 98.1, heart rate 66, respiratory rate 22, blood pressure 82/38, and oxygen saturation 94% on room air.  Initial troponin T 0.020 with repeat 0.039.  proBNP 5965.  CMP with glucose 18 and albumin 3.21, otherwise unremarkable.  Initial lactic acid 3.3.  CBC with white blood cell count 12.35, hemoglobin 11.7, neutrophil percentage 79.5%, and absolute neutrophil count 9.2.  ABG with pH 7.421, PCO2 35.8, PO2 53.2, HCO3 23.2, and oxygen saturation 88.9% on room air.  Chest x-ray with CHF/edema superimposed on chronic changes, basilar airspace disease.  While emergency department, patient was administered 650 mg p.o. Tylenol.  Patient was given empiric dose of IV Levaquin 750 mg.  Patient also received 1.8 L normal saline.  IV fluids, patient's blood pressure did stabilize.    Patient has been admitted to the telemetry floor for further evaluation and treatment.     Present during exam: Patient's daughter   ---------------------------------------------------------------------------------------------------------------------   Review of Systems   Constitutional: Negative for activity change, appetite change, chills, diaphoresis, fatigue and fever.   HENT: Negative for  congestion, postnasal drip, rhinorrhea, sinus pain, sore throat and trouble swallowing.    Eyes: Negative for discharge and visual disturbance.   Respiratory: Positive for shortness of breath. Negative for cough (yellow/green sputum production ), chest tightness and wheezing.    Cardiovascular: Positive for chest pain. Negative for palpitations and leg swelling.   Gastrointestinal: Negative for abdominal pain, constipation, diarrhea, nausea and vomiting.   Endocrine: Negative for cold intolerance and heat intolerance.   Genitourinary: Negative for decreased urine volume, dysuria, frequency and urgency.   Musculoskeletal: Negative for arthralgias, gait problem and myalgias.   Skin: Negative for color change, rash and wound.   Allergic/Immunologic: Negative for environmental allergies and immunocompromised state.   Neurological: Negative for dizziness, syncope, weakness, light-headedness and headaches.   Hematological: Negative for adenopathy. Does not bruise/bleed easily.   Psychiatric/Behavioral: Negative for confusion and decreased concentration. The patient is not nervous/anxious.       ---------------------------------------------------------------------------------------------------------------------   Past Medical History:   Diagnosis Date   • Aortic aneurysm (CMS/HCC)    • Black lung (CMS/[facility])    • Emphysema lung (CMS/[facility]) 2/29/2020   • History of abdominal aortic aneurysm (AAA) 2/29/2020   • Macular degeneration 2/29/2020     Past Surgical History:   Procedure Laterality Date   • ABDOMINAL AORTIC ANEURYSM REPAIR     • CARDIAC CATHETERIZATION      x3      History reviewed. No pertinent family history.  Social History     Socioeconomic History   • Marital status:      Spouse name: Not on file   • Number of children: Not on file   • Years of education: Not on file   • Highest education level: Not on file   Tobacco Use   • Smoking status: Former Smoker     Types: Cigarettes, Pipe     Last attempt to quit:  1989     Years since quittin.0   • Smokeless tobacco: Never Used   Substance and Sexual Activity   • Alcohol use: Never     Frequency: Never   • Drug use: Never   • Sexual activity: Defer     ---------------------------------------------------------------------------------------------------------------------   Allergies:  Penicillins  ---------------------------------------------------------------------------------------------------------------------   Medications below are reported home medications pulling from within the system; at this time, these medications have not been reconciled unless otherwise specified and are in the verification process for further verifcation as current home medications.    Prior to Admission Medications     Prescriptions Last Dose Informant Patient Reported? Taking?    acetaminophen-codeine (TYLENOL #3) 300-30 MG per tablet   Yes Yes    Take 1 tablet by mouth Every 8 (Eight) Hours As Needed for Moderate Pain .    albuterol sulfate  (90 Base) MCG/ACT inhaler   Yes Yes    Inhale 2 puffs Every 6 (Six) Hours As Needed for Wheezing.    amiodarone (PACERONE) 200 MG tablet   Yes Yes    Take 200 mg by mouth Daily.    amoxicillin (AMOXIL) 500 MG capsule   Yes Yes    Take 1,000 mg by mouth 2 (Two) Times a Day.    brimonidine (ALPHAGAN) 0.2 % ophthalmic solution   Yes Yes    Administer 1 drop to the right eye 2 (Two) Times a Day.    cefdinir (OMNICEF) 300 MG capsule   Yes Yes    Take 300 mg by mouth 2 (Two) Times a Day.    doxycycline (VIBRAMYCIN) 100 MG capsule   Yes Yes    Take 100 mg by mouth 2 (Two) Times a Day.    Ergocalciferol (VITAMIN D2 PO)   Yes Yes    Take 1.25 mg by mouth.    furosemide (LASIX) 20 MG tablet   Yes Yes    Take 20 mg by mouth Daily.    guaiFENesin 200 MG tablet   Yes Yes    Take 400 mg by mouth 2 (Two) Times a Day.    ipratropium-albuterol (DUO-NEB) 0.5-2.5 mg/3 ml nebulizer   Yes Yes    Take 3 mL by nebulization Every 4 (Four) Hours As Needed for  Wheezing.    naproxen (NAPROSYN) 375 MG tablet   Yes Yes    Take 375 mg by mouth 2 (Two) Times a Day As Needed for Mild Pain .    nitroglycerin (NITROSTAT) 0.4 MG SL tablet   Yes Yes    Place 0.4 mg under the tongue Every 5 (Five) Minutes As Needed for Chest Pain. Take no more than 3 doses in 15 minutes.    potassium chloride (K-DUR) 10 MEQ CR tablet   Yes Yes    Take 10 mEq by mouth Daily.    predniSONE (DELTASONE) 20 MG tablet   Yes Yes    Take 20 mg by mouth Daily.    tiotropium (SPIRIVA) 18 MCG per inhalation capsule   Yes Yes    Place 1 capsule into inhaler and inhale Daily.        ---------------------------------------------------------------------------------------------------------------------    Objective     Hospital Scheduled Meds:    cefTRIAXone 1 g Intravenous Q24H   doxycycline 100 mg Intravenous Q12H   heparin (porcine) 5,000 Units Subcutaneous Q8H   ipratropium 0.5 mg Nebulization Q6H   multivitamin 1 tablet Oral Daily   sodium chloride 10 mL Intravenous Q12H          Current listed hospital scheduled medications may not yet reflect those currently placed in orders that are signed and held, awaiting patient's arrival to floor/unit.    ---------------------------------------------------------------------------------------------------------------------   Vital Signs:  Temp:  [98.1 °F (36.7 °C)-98.4 °F (36.9 °C)] 98.4 °F (36.9 °C)  Heart Rate:  [63-96] 69  Resp:  [16-22] 18  BP: ()/(38-95) 114/58  Mean Arterial Pressure (Non-Invasive) for the past 24 hrs (Last 3 readings):   Noninvasive MAP (mmHg)   02/29/20 1634 86   02/29/20 1612 68   02/29/20 1602 76     SpO2 Percentage    02/29/20 1602 02/29/20 1612 02/29/20 1634   SpO2: 100% 100% 96%     SpO2:  [85 %-100 %] 96 %  on   ;        Body mass index is 21.79 kg/m².  Wt Readings from Last 3 Encounters:   02/29/20 61.2 kg (135 lb)          ---------------------------------------------------------------------------------------------------------------------   Physical Exam:  Physical Exam   Constitutional: He is oriented to person, place, and time. He appears well-developed and well-nourished.  Non-toxic appearance. He appears ill (Chronically ). No distress. Nasal cannula in place.   Elderly, pleasant, sitting up on edge of bed, no acute distress noted. Daughter present at bedside.   HENT:   Head: Normocephalic and atraumatic.   Right Ear: External ear normal.   Left Ear: External ear normal.   Nose: Nose normal.   Mouth/Throat: Oropharynx is clear and moist and mucous membranes are normal. No oropharyngeal exudate.   Eyes: Pupils are equal, round, and reactive to light. Conjunctivae are normal. No scleral icterus.   Degenerative changes of sclera/conjunctiva, pt with hx of chronic macular degeneration and decreased vision   Neck: Neck supple. No JVD present. Carotid bruit is not present.   Cardiovascular: Normal rate, regular rhythm, normal heart sounds and intact distal pulses. Exam reveals no gallop and no friction rub.   No murmur heard.  Pulses:       Dorsalis pedis pulses are 2+ on the right side, and 2+ on the left side.        Posterior tibial pulses are 2+ on the right side, and 2+ on the left side.   Pulmonary/Chest: Effort normal. No respiratory distress. He has no wheezes. He has no rhonchi. He has rales in the right lower field and the left lower field. He exhibits no tenderness.   Abdominal: Soft. Bowel sounds are normal. He exhibits no distension and no mass. There is no hepatosplenomegaly. There is no tenderness. There is no rebound and no guarding. No hernia.   Genitourinary:   Genitourinary Comments: No ochoa catheter in place, making urine.   Musculoskeletal: He exhibits no tenderness or deformity.        Right lower leg: He exhibits edema (trace).        Left lower leg: He exhibits edema (trace).     Vascular Status -  His right  foot exhibits normal foot vasculature . His left foot exhibits normal foot vasculature .  Neurological: He is alert and oriented to person, place, and time. He displays atrophy. No cranial nerve deficit or sensory deficit.   Awake and alert. Follows commands. Answers questions appropriately. Moves all extremities equally. Global age related muscular atrophy but symmetrical. Sensation intact. No focal neuro deficits on exam.   Skin: Skin is warm and dry. Capillary refill takes less than 2 seconds. No rash noted. No erythema. No pallor.   Varicose veins bilateral lower extremities consistent with chronic venous stasis    Psychiatric: He has a normal mood and affect. His speech is normal and behavior is normal. Judgment and thought content normal.   Nursing note and vitals reviewed.    ---------------------------------------------------------------------------------------------------------------------  EKG:      Telemetry:    Sinus 70s     I have personally reviewed the EKG/Telemetry strip  ---------------------------------------------------------------------------------------------------------------------   Results from last 7 days   Lab Units 02/29/20  1718 02/29/20  1034 02/29/20  0845   TROPONIN T ng/mL 0.055* 0.039* 0.020     Results from last 7 days   Lab Units 02/29/20  0845   PROBNP pg/mL 5,965.0*       Results from last 7 days   Lab Units 02/29/20  0908   PH, ARTERIAL pH units 7.421   PO2 ART mm Hg 53.2*   PCO2, ARTERIAL mm Hg 35.8   HCO3 ART mmol/L 23.2     Results from last 7 days   Lab Units 02/29/20  1237 02/29/20  0845   LACTATE mmol/L 2.4* 3.3*   WBC 10*3/mm3  --  12.35*   HEMOGLOBIN g/dL  --  11.7*   HEMATOCRIT %  --  39.1   MCV fL  --  94.2   MCHC g/dL  --  29.9*   PLATELETS 10*3/mm3  --  182     Results from last 7 days   Lab Units 02/29/20  0845   SODIUM mmol/L 141   POTASSIUM mmol/L 3.7   CHLORIDE mmol/L 105   CO2 mmol/L 22.0   BUN mg/dL 18   CREATININE mg/dL 0.96   EGFR IF NONAFRICN AM mL/min/1.73  73   CALCIUM mg/dL 8.6   GLUCOSE mg/dL 187*   ALBUMIN g/dL 3.21*   BILIRUBIN mg/dL 0.4   ALK PHOS U/L 90   AST (SGOT) U/L 22   ALT (SGPT) U/L 18   Estimated Creatinine Clearance: 39 mL/min (by C-G formula based on SCr of 0.96 mg/dL).  Lab Results   Component Value Date    HGBA1C 5.80 (H) 02/29/2020     No results found for: TSH, FREET4    Microbiology Results (last 10 days)     ** No results found for the last 240 hours. **         I have personally reviewed the above laboratory results.   ---------------------------------------------------------------------------------------------------------------------  Imaging Results (Last 7 Days)     Procedure Component Value Units Date/Time    XR Chest 1 View [[id number]] Collected:  02/29/20 1233     Updated:  02/29/20 1236    Narrative:       EXAMINATION: XR CHEST 1 VW-      CLINICAL INDICATION:     Chest pain     TECHNIQUE:  XR CHEST 1 VW-      COMPARISON: NONE      FINDINGS:      Bilateral interstitial thickening most consistent with edema. Underlying  chronic lung changes are noted. Patchy basilar predominant airspace  disease represents atelectasis, edema, or pneumonia.   Cardiomegaly. Pulmonary vascular congestion.   No pneumothorax.   Trace effusions.   No acute osseous findings.          Impression:       1. CHF/edema superimposed on chronic changes.  2. Basilar airspace disease.     This report was finalized on 2/29/2020 12:34 PM by Dr. Arnoldo Scott MD.           I have personally reviewed the above radiology results.   ---------------------------------------------------------------------------------------------------------------------    Assessment & Plan      · Severe sepsis secondary to bibasilar community acquired pneumonia: Patient meets severe sepsis criteria with lactic acidosis, respiratory rate greater than 20, hypotension present at time of presentation, acute hypoxic respiratory failure and mild leukocytosis.  Blood cultures obtained in ED, add  respiratory culture and respiratory panel PCR and follow for final results.  Continue empiric treatment with IV Rocephin and doxycycline pending atypical work-up.  Supplemental oxygen to touch SPO2 greater than 90%.  Obtain pro calcitonin level.  Trend lactic acid until normalized.  Repeat CBC in a.m.  · Suspected CHF with probable mild acute exacerbation: No echo on file.  However patient does have appearance on chest x-ray consistent with CHF and rales on exam.  No lower extremity significant edema.  Could be related to pneumonia but may also have underlying CHF.  Patient and daughter at bedside unsure of this diagnosis.  Does appear that he is on Lasix at home.  Patient was hypotensive at time of presentation but did respond with IV fluids, therefore convoluted on CHF versus pneumonia.  Will hold off on diuresis as patient was hypotensive at time of presentation.  Hold off on any further IV fluids.  Closely monitor volume status, strict I's and O's and daily weights.  Treat pneumonia as outlined above.  Repeat proBNP in a.m.   · Acute hypoxic respiratory failure: Likely secondary to combination of above.  Supplemental oxygen titrate SPO2 greater than 90%.  Continuous pulse oximetry monitoring.  · Atypical chest pain with dyspnea on exertion with mild troponin elevation: Again this could be due to underlying CHF.  Patient does have mild troponin elevation.  He does have risk factors.  Uncertain whether his had stents in the past.  Obtain records from patient's cardiologist in [facility], Cornelio Darby.  Will trend troponin.  Daily aspirin.  Telemetry monitoring.  Repeat EKG in a.m.  · Left bundle branch block: Unknown chronicity.  Obtain records from PCP/cardiology office.  Repeat EKG in a.m.  Telemetry monitoring.  Trend troponin.  QTC is prolonged, however distorted due to left bundle branch block.  Avoid any further prolonging agents, Levaquin given in ED, discontinued.  Monitor electrolytes and replace  per protocol.  · Normocytic anemia: Hemoglobin stable without active bleeding.  Monitor closely, repeat CBC in a.m.  · Hyperglycemia without history of diabetes mellitus: Obtain hemoglobin A1c.  · Hypoalbuminemia: Likely multifactorial.  Monitor closely.  · History of AAA status post repair  · Age-related debility: PT/OT consults  · Advanced aage  · F/E/N: No IV fluids.  Replace electrolytes per protocol.  Regular diet.    ---------------------------------------------------  DVT Prophylaxis: Subcutaneous heparin  GI Prophylaxis: Pepcid  Activity: Up with assistance, falls precautions    The patient is considered to be a high risk patient due to: Severe sepsis, pneumonia, probable CHF exacerbation, chest pain, dyspnea on exertion, left bundle branch block, and advanced age    INPATIENT status due to the need for care which can only be reasonably provided in an hospital setting such as aggressive/expedited ancillary services and/or consultation services, the necessity for IV medications, close physician monitoring and/or the possible need for procedures.  In such, I feel patient’s risk for adverse outcomes and need for care warrant INPATIENT evaluation and predict the patient’s care encounter to likely last beyond 2 midnights.    Code Status: FULL CODE    I have discussed the patient's assessment and plan with the patient, patient's daughter at bedside, and AM RN Paulo as well as attending physician Dr. Hernandez.       Ketty Reddy PA-C  Hospitalist Service -- [facility]   Pager: [phone]    02/29/20  6:13 PM    Attending Physician: Dr. Mary MD      ---------------------------------------------------------------------------------------------------------------------          Electronically signed by Karthik Hernandez MD at 02/29/20 1926       Vital Signs (last day)     Date/Time   Temp   Temp src   Pulse   Resp   BP   Patient Position   SpO2    03/05/20 1053   98.4 (36.9)   Oral   75   18   96/48    Sitting   97    03/05/20 0649   --   --   --   --   --   --   94    03/05/20 0545   98 (36.7)   Oral   76   18   128/64   Lying   95    03/05/20 0256   98.1 (36.7)   Oral   79   16   137/58   Lying   93    03/04/20 1848   --   --   78   18   --   --   96    03/04/20 1817   97.4 (36.3)   Oral   76   18   107/49   Lying   96    03/04/20 1500   97.8 (36.6)   Oral   76   18   129/54   Lying   95    03/04/20 1037   97.9 (36.6)   Oral   83   18   118/54   Lying   95    03/04/20 0914   --   --   86   18   --   --   95    03/04/20 0618   97.9 (36.6)   Oral   74   18   104/53   Lying   93    03/04/20 0300   97.4 (36.3)   Oral   73   18   104/54   Lying   --    03/04/20 0111   --   --   70   18   --   --   96              Lines, Drains & Airways    Active LDAs     None                  Current Facility-Administered Medications   Medication Dose Route Frequency Provider Last Rate Last Dose   • acetaminophen (TYLENOL) tablet 650 mg  650 mg Oral Q6H PRN Ketty Reddy PA   650 mg at 03/01/20 2109   • albuterol (PROVENTIL) nebulizer solution 0.083% 2.5 mg/3mL  2.5 mg Nebulization Q6H PRN Karthik Hernandez MD       • aspirin chewable tablet 81 mg  81 mg Oral Daily Karthik Hernandez MD   81 mg at 03/05/20 0827   • atorvastatin (LIPITOR) tablet 40 mg  40 mg Oral Nightly Karthik Hernandez MD   40 mg at 03/04/20 2041   • carvedilol (COREG) tablet 3.125 mg  3.125 mg Oral BID With Meals Ketty Reddy PA   3.125 mg at 03/05/20 0827   • cefdinir (OMNICEF) capsule 300 mg  300 mg Oral Q12H Elva Hickman PA-C   300 mg at 03/05/20 0827   • famotidine (PEPCID) tablet 20 mg  20 mg Oral Daily Ketty Reddy PA   20 mg at 03/05/20 0827   • guaiFENesin (MUCINEX) 12 hr tablet 600 mg  600 mg Oral Q12H Joanne Pinzon PA-C   600 mg at 03/05/20 0827   • heparin (porcine) 5000 UNIT/ML injection 5,000 Units  5,000 Units Subcutaneous Q8H Karthik Hernandez MD   5,000 Units at 03/05/20 0529   • hydrOXYzine (ATARAX) tablet 25 mg  25 mg  Oral TID PRN Joanne Pinzon PA-C   25 mg at 03/04/20 2041   • ipratropium (ATROVENT) nebulizer solution 0.5 mg  0.5 mg Nebulization Q6H PRN Quentin Oliveros MD       • ipratropium-albuterol (DUO-NEB) nebulizer solution 3 mL  3 mL Nebulization Q4H PRN Karthik Hernandez MD       • lactobacillus acidophilus (RISAQUAD) capsule 1 capsule  1 capsule Oral Daily Jacque Auguste, [facility]   1 capsule at 03/05/20 0827   • lisinopril (PRINIVIL,ZESTRIL) tablet 2.5 mg  2.5 mg Oral Q24H Elva Hickman PA-C   2.5 mg at 03/05/20 0827   • melatonin tablet 5 mg  5 mg Oral Nightly PRN Joanne Pinzon PA-C   5 mg at 03/04/20 2206   • multivitamin (DAILY PAL) tablet 1 tablet  1 tablet Oral Daily Ketty Reddy PA   1 tablet at 03/05/20 0827   • nitroglycerin (NITROSTAT) SL tablet 0.4 mg  0.4 mg Sublingual Q5 Min PRN Karthik Hernandez MD       • potassium chloride (K-DUR,KLOR-CON) CR tablet 40 mEq  40 mEq Oral Daily Elva Hickman PA-C   40 mEq at 03/05/20 1026   • promethazine (PHENERGAN) 12.5 mg in sodium chloride 0.9 % 50 mL  12.5 mg Intravenous Q6H PRN Joanne Pinzon PA-C   12.5 mg at 02/29/20 2332   • sodium chloride 0.9 % flush 10 mL  10 mL Intravenous PRN Daniel Rust MD       • sodium chloride 0.9 % flush 10 mL  10 mL Intravenous Q12H Karthik Hernandez MD   10 mL at 03/04/20 2041   • sodium chloride 0.9 % flush 10 mL  10 mL Intravenous PRN Karthik Hernandez MD           Lab Results (last 24 hours)     Procedure Component Value Units Date/Time    Blood Culture - Blood, Arm, Right [[id number]] Collected:  02/29/20 0910    Specimen:  Blood from Arm, Right Updated:  03/05/20 1030     Blood Culture No growth at 5 days    Blood Culture - Blood, Arm, Right [[id number]] Collected:  02/29/20 0845    Specimen:  Blood from Arm, Right Updated:  03/05/20 1030     Blood Culture No growth at 5 days    Basic Metabolic Panel [[id number]]  (Abnormal) Collected:  03/05/20 0446    Specimen:  Blood Updated:  03/05/20 0551      Glucose 105 mg/dL      BUN 24 mg/dL      Creatinine 0.84 mg/dL      Sodium 133 mmol/L      Potassium 3.4 mmol/L      Chloride 102 mmol/L      CO2 22.9 mmol/L      Calcium 8.2 mg/dL      eGFR Non African Amer 85 mL/min/1.73      BUN/Creatinine Ratio 28.6     Anion Gap 8.1 mmol/L     Narrative:       GFR Normal >60  Chronic Kidney Disease <60  Kidney Failure <15      CBC & Differential [[id number]] Collected:  03/05/20 0446    Specimen:  Blood Updated:  03/05/20 0540    Narrative:       The following orders were created for panel order CBC & Differential.  Procedure                               Abnormality         Status                     ---------                               -----------         ------                     CBC Auto Differential[[id number]]        Abnormal            Final result                 Please view results for these tests on the individual orders.    CBC Auto Differential [[id number]]  (Abnormal) Collected:  03/05/20 0446    Specimen:  Blood Updated:  03/05/20 0540     WBC 9.44 10*3/mm3      RBC 4.23 10*6/mm3      Hemoglobin 11.8 g/dL      Hematocrit 39.5 %      MCV 93.4 fL      MCH 27.9 pg      MCHC 29.9 g/dL      RDW 14.6 %      RDW-SD 50.3 fl      MPV 9.6 fL      Platelets 179 10*3/mm3      Neutrophil % 68.4 %      Lymphocyte % 14.0 %      Monocyte % 13.1 %      Eosinophil % 2.8 %      Basophil % 0.6 %      Immature Grans % 1.1 %      Neutrophils, Absolute 6.46 10*3/mm3      Lymphocytes, Absolute 1.32 10*3/mm3      Monocytes, Absolute 1.24 10*3/mm3      Eosinophils, Absolute 0.26 10*3/mm3      Basophils, Absolute 0.06 10*3/mm3      Immature Grans, Absolute 0.10 10*3/mm3      nRBC 0.0 /100 WBC         Orders (last 24 hrs)      Start     Ordered    03/06/20 0000  aspirin 81 MG chewable tablet  Daily      03/05/20 1113 03/06/20 0000  famotidine (PEPCID) 20 MG tablet  Daily      03/05/20 1113    03/06/20 0000  lactobacillus acidophilus (RISAQUAD) capsule capsule  Daily      03/05/20 1113     03/06/20 0000  lisinopril (PRINIVIL,ZESTRIL) 2.5 MG tablet  Every 24 Hours Scheduled      03/05/20 1113    03/06/20 0000  multivitamin (DAILY PAL) tablet tablet  Daily      03/05/20 1113    03/05/20 1200  Dietary Nutrition Supplements Magic Cup  Daily With Lunch & Dinner     Comments:  Vanilla only  - thank you!    03/05/20 1110    03/05/20 1113  Discontinue IV  Once      03/05/20 1113    03/05/20 1111  Discharge patient  Once      03/05/20 1113    03/05/20 1111  Discontinue IV  Once      03/05/20 1113    03/05/20 1111  Discontinue Telemetry  Once      03/05/20 1113    03/05/20 1111  Discontinue Oxygen  Once      03/05/20 1113    03/05/20 1111  ACEI or ARB for EF < 40%  Once      03/05/20 1113    03/05/20 1111  Beta-Blocker for EF < 40%  Once      03/05/20 1113    03/05/20 1111  Discontinue IV  Once      03/05/20 1113    03/05/20 1100  potassium chloride (K-DUR,KLOR-CON) CR tablet 40 mEq  Daily      03/05/20 0855    03/05/20 0600  Basic Metabolic Panel  Morning Draw      03/04/20 0843    03/05/20 0600  CBC & Differential  Morning Draw      03/04/20 0843    03/05/20 0600  CBC Auto Differential  PROCEDURE ONCE      03/05/20 0003    03/05/20 0000  carvedilol (COREG) 3.125 MG tablet  2 Times Daily With Meals      03/05/20 1113    03/05/20 0000  cefdinir (OMNICEF) 300 MG capsule  Every 12 Hours Scheduled      03/05/20 1113    03/05/20 0000  Discharge Follow-up with PCP      03/05/20 1113    03/05/20 0000  Discharge Follow-up with Specified Provider: Dr. Cornelio bartlettron [phone] (this is his cardiologist per his Daughter and they prefer to follow-up with him); 2 Weeks      03/05/20 1113    03/05/20 0000  Walker      03/05/20 1113    03/05/20 0000  Commode Chair      03/05/20 1113    03/05/20 0000  Referral to Home Health     Comments:  Obtain BMP on 3/9    03/05/20 1113    03/05/20 0000  Diet: Cardiac      03/05/20 1113    03/05/20 0000  CBC & Differential     Comments:  Home health to perform      03/05/20 1113     03/05/20 0000  Comprehensive Metabolic Panel     Comments:  Home Health to perform      03/05/20 1113    03/05/20 0000  Hospital Bed     Comments:  Significant debility    03/05/20 1113    03/05/20 0000  Measure Blood Pressure      03/05/20 1113    03/05/20 0000  Discharge Instructions     Comments:  -HOLD HOME AMIODARONE UNTIL YOU SEE YOUR HEART SPECIALIST.    03/05/20 1113    03/04/20 1233  FL Video Swallow Single Contrast  1 Time Imaging      03/04/20 1232    03/04/20 1000  cefdinir (OMNICEF) capsule 300 mg  Every 12 Hours Scheduled      03/04/20 0838    03/04/20 0900  lisinopril (PRINIVIL,ZESTRIL) tablet 2.5 mg  Every 24 Hours Scheduled      03/04/20 0733    03/03/20 1400  ipratropium (ATROVENT) nebulizer solution 0.5 mg  Every 6 Hours PRN      03/03/20 1359    03/03/20 0147  melatonin tablet 5 mg  Nightly PRN      03/03/20 0148    03/02/20 1800  lactobacillus acidophilus (RISAQUAD) capsule 1 capsule  Daily      03/02/20 1602    03/01/20 1145  carvedilol (COREG) tablet 3.125 mg  2 Times Daily With Meals      03/01/20 1055    03/01/20 1104  ipratropium-albuterol (DUO-NEB) nebulizer solution 3 mL  Every 4 Hours PRN      03/01/20 1104    03/01/20 1103  albuterol (PROVENTIL) nebulizer solution 0.083% 2.5 mg/3mL  Every 6 Hours PRN      03/01/20 1104    03/01/20 0000  guaiFENesin (MUCINEX) 12 hr tablet 600 mg  Every 12 Hours Scheduled      02/29/20 2313    02/29/20 2312  hydrOXYzine (ATARAX) tablet 25 mg  3 Times Daily PRN      02/29/20 2312    02/29/20 2311  promethazine (PHENERGAN) 12.5 mg in sodium chloride 0.9 % 50 mL  Every 6 Hours PRN      02/29/20 2312    02/29/20 2200  heparin (porcine) 5000 UNIT/ML injection 5,000 Units  Every 8 Hours Scheduled      02/29/20 1702    02/29/20 2100  sodium chloride 0.9 % flush 10 mL  Every 12 Hours Scheduled      02/29/20 1702 02/29/20 2100  atorvastatin (LIPITOR) tablet 40 mg  Nightly      02/29/20 1907 02/29/20 2030  aspirin chewable tablet 81 mg  Daily      02/29/20  1907    02/29/20 2000  famotidine (PEPCID) tablet 20 mg  Daily      02/29/20 1818 02/29/20 1800  multivitamin (DAILY PAL) tablet 1 tablet  Daily      02/29/20 1706    02/29/20 1704  acetaminophen (TYLENOL) tablet 650 mg  Every 6 Hours PRN      02/29/20 1706    02/29/20 1702  sodium chloride 0.9 % flush 10 mL  As Needed      02/29/20 1702 02/29/20 1702  nitroglycerin (NITROSTAT) SL tablet 0.4 mg  Every 5 Minutes PRN      02/29/20 1702 02/29/20 0835  sodium chloride 0.9 % flush 10 mL  As Needed      02/29/20 0841    Unscheduled  Telemetry - Pulse Oximetry  Continuous PRN     Comments:  If Patient Develops Unresponsiveness, Acute Dyspnea, Cyanosis or Suspected Hypoxemia Start Continuous Pulse Ox Monitoring, Apply Oxygen & Notify Provider    02/29/20 1702    Unscheduled  Oxygen Therapy- Nasal Cannula; Titrate for SPO2: 90% - 95%  Continuous PRN     Comments:  If Patient Develops Unresponsiveness, Acute Dyspnea, Cyanosis or Suspected Hypoxemia Start Continuous Pulse Ox Monitoring, Apply Oxygen & Notify Provider    02/29/20 1702    Unscheduled  ECG 12 Lead  As Needed     Comments:  Nurse to Release if Patient Expericences Acute Chest Pain or Dysrhythmias    02/29/20 1702    Unscheduled  Potassium  As Needed     Comments:  For Ventricular Arrhythmias      02/29/20 1702    Unscheduled  Magnesium  As Needed     Comments:  For Ventricular Arrhythmias      02/29/20 1702    Unscheduled  Troponin  As Needed     Comments:  For Chest Pain      02/29/20 1702    Unscheduled  Digoxin Level  As Needed     Comments:  For Atrial Arrhythmias      02/29/20 1702    Unscheduled  Blood Gas, Arterial  As Needed     Comments:  Per O2 PolicyNotify Physician      02/29/20 1702    Unscheduled  Up With Assistance  As Needed      02/29/20 1702    --  potassium chloride (K-DUR) 10 MEQ CR tablet  Daily      02/29/20 0917    --  amiodarone (PACERONE) 200 MG tablet  Daily      02/29/20 0917    --  nitroglycerin (NITROSTAT) 0.4 MG SL tablet   Every 5 Minutes PRN      20    --  ipratropium-albuterol (DUO-NEB) 0.5-2.5 mg/3 ml nebulizer  Every 4 Hours PRN      20    --  albuterol sulfate  (90 Base) MCG/ACT inhaler  Every 6 Hours PRN      20    --  naproxen (NAPROSYN) 375 MG tablet  2 Times Daily PRN      20    --  furosemide (LASIX) 20 MG tablet  Daily      20                Operative/Procedure Notes (last 24 hours) (Notes from 20 1136 through 20 113)    No notes of this type exist for this encounter.         Physician Progress Notes (last 24 hours) (Notes from 20 1136 through 20 113)    No notes of this type exist for this encounter.         Consult Notes (last 24 hours) (Notes from 20 1136 through 20 113)    No notes of this type exist for this encounter.            Physical Therapy Notes (last 24 hours) (Notes from 20 1136 through 20 113)      Mili Vasquez, PT at 20 1512  Version 1 of 1         Acute Care - Physical Therapy Treatment Note  JOHNNA Fatima     Patient Name: Rafael Hussein  : 1923  MRN: [id number]  Today's Date: 3/4/2020     Date of Referral to PT: 20       Admit Date: 2020    Visit Dx:    ICD-10-CM ICD-9-CM   1. Community acquired pneumonia of right lower lobe of lung (CMS/[facility]) J18.1 481   2. Acute respiratory failure with hypoxia (CMS/[facility]) J96.01 518.81   3. Sepsis with acute hypoxic respiratory failure, due to unspecified organism, unspecified whether septic shock present (CMS/[facility]) A41.9 038.9    R65.20 995.91    J96.01 518.81     Patient Active Problem List   Diagnosis   • Community acquired pneumonia of right lower lobe of lung (CMS/[facility])   • Normocytic anemia   • Former smoker   • Advanced age   • Hypoalbuminemia   • Lactic acidosis   • Elevated brain natriuretic peptide (BNP) level   • Elevated troponin   • LBBB (left bundle branch block)   • History of abdominal aortic aneurysm (AAA)   • Black  lung (CMS/[facility])   • Macular degeneration   • Emphysema lung (CMS/[facility])       Therapy Treatment    Rehabilitation Treatment Summary     Row Name 03/04/20 1500             Treatment Time/Intention    Discipline  physical therapist  -BC      Document Type  evaluation  -BC      Mode of Treatment  physical therapy  -BC      Patient Effort  poor  -BC      Recorded by [BC] Mili Vasquez, PT 03/04/20 1510      Row Name 03/04/20 1500             Cognitive Assessment/Intervention- PT/OT    Follows Commands (Cognition)  does not follow one step commands  -BC      Recorded by [BC] Mili Vasquez, PT 03/04/20 1510      Row Name 03/04/20 1500             Mobility Assessment/Intervention    Extremity Weight-bearing Status  left lower extremity;right lower extremity  -BC      Left Lower Extremity (Weight-bearing Status)  full weight-bearing (FWB)  -BC      Right Lower Extremity (Weight-bearing Status)  full weight-bearing (FWB)  -BC      Recorded by [BC] Mili Vasquez, PT 03/04/20 1510      Row Name 03/04/20 1500             Bed Mobility Assessment/Treatment    Bed Mobility Assessment/Treatment  bed mobility (all) activities  -BC      [place] Level (Bed Mobility)  moderate assist (50% patient effort)  -BC      Bed Mobility, Safety Issues  decreased use of arms for pushing/pulling;decreased use of legs for bridging/pushing  -BC      Assistive Device (Bed Mobility)  bed rails  -BC      Recorded by [BC] Mili Vasquez, PT 03/04/20 1510      Row Name 03/04/20 1500             Transfer Assessment/Treatment    Transfer Assessment/Treatment  sit-stand transfer;stand-sit transfer  -BC      Recorded by [BC] Mili Vasquez, PT 03/04/20 1510      Row Name 03/04/20 1500             Sit-Stand Transfer    Sit-Stand [place] (Transfers)  moderate assist (50% patient effort)  -BC      Assistive Device (Sit-Stand Transfers)  walker, front-wheeled  -BC      Recorded by [BC] Mili Vasquez, PT 03/04/20 1510      Row Name  03/04/20 1500             Stand-Sit Transfer    Stand-Sit [place] (Transfers)  moderate assist (50% patient effort)  -BC      Assistive Device (Stand-Sit Transfers)  walker, front-wheeled  -BC      Recorded by [BC] Mili Vasquez, PT 03/04/20 1510      Row Name 03/04/20 1500             Gait/Stairs Assessment/Training    Gait/Stairs Assessment/Training  gait/ambulation independence  -BC      [place] Level (Gait)  moderate assist (50% patient effort)  -BC      Assistive Device (Gait)  walker, front-wheeled  -BC      Distance in Feet (Gait)  60 ft  -BC      Recorded by [BC] Mili Vasquez, PT 03/04/20 1510      Row Name 03/04/20 1500             Positioning and Restraints    Pre-Treatment Position  sitting in chair/recliner  -BC      Post Treatment Position  bed  -BC      In Bed  notified nsg;call light within reach;encouraged to call for assist;with family/caregiver;side rails up x3  -BC      Recorded by [BC] Mili Vasquez, PT 03/04/20 1510      Row Name 03/04/20 1500             Coping    Observed Emotional State  accepting;calm;cooperative  -BC      Verbalized Emotional State  acceptance  -BC      Recorded by [BC] Mili Vasquez, PT 03/04/20 1510      Row Name 03/04/20 1500             Plan of Care Review    Plan of Care Reviewed With  patient  -BC      Recorded by [BC] Mili Vasquez, PT 03/04/20 1510        User Key  (r) = Recorded By, (t) = Taken By, (c) = Cosigned By    Initials Name Effective Dates Discipline    BC Mili Vasquez, PT 03/14/16 -  PT                   Physical Therapy Education                 Title: PT OT SLP Therapies (In Progress)     Topic: Physical Therapy (In Progress)     Point: Mobility training (In Progress)     Description:   Instruct learner(s) on safety and technique for assisting patient out of bed, chair or wheelchair.  Instruct in the proper use of assistive devices, such as walker, crutches, cane or brace.              Patient Friendly Description:    It's important to get you on your feet again, but we need to do so in a way that is safe for you. Falling has serious consequences, and your personal safety is the most important thing of all.        When it's time to get out of bed, one of us or a family member will sit next to you on the bed to give you support.     If your doctor or nurse tells you to use a walker, crutches, a cane, or a brace, be sure you use it every time you get out of bed, even if you think you don't need it.    Learning Progress Summary           Patient Acceptance, E, NR by BC at 3/3/2020 1649                   Point: Home exercise program (In Progress)     Description:   Instruct learner(s) on appropriate technique for monitoring, assisting and/or progressing patient with therapeutic exercises and activities.              Learning Progress Summary           Patient Acceptance, E, NR by BC at 3/3/2020 1649                   Point: Body mechanics (In Progress)     Description:   Instruct learner(s) on proper positioning and spine alignment for patient and/or caregiver during mobility tasks and/or exercises.              Learning Progress Summary           Patient Acceptance, E, NR by BC at 3/3/2020 1649                   Point: Precautions (In Progress)     Description:   Instruct learner(s) on prescribed precautions during mobility and gait tasks              Learning Progress Summary           Patient Acceptance, E, NR by BC at 3/3/2020 1649                               User Key     Initials Effective Dates Name Provider Type Discipline    BC 03/14/16 -  Mili Vasquez, PT Physical Therapist PT                PT Recommendation and Plan     Plan of Care Reviewed With: patient     Time Calculation:   PT Charges     Row Name 03/04/20 1510             Time Calculation    PT Received On  03/04/20  -BC         Time Calculation- PT    Total Timed Code Minutes- PT  30 minute(s)  -BC         Timed Charges    85211 - Gait Training Minutes   15   -BC      34762 - PT Therapeutic Activity Minutes  15  -BC        User Key  (r) = Recorded By, (t) = Taken By, (c) = Cosigned By    Initials Name Provider Type    BC Mili Vasquez PT Physical Therapist        Therapy Charges for Today     Code Description Service Date Service Provider Modifiers Qty    24749947665 HC GAIT TRAINING EA 15 MIN 3/3/2020 Mili Vasquez, PT GP 1    07888851885 HC PT EVAL MOD COMPLEXITY 3 3/3/2020 Mili Vasquez, PT GP 1    37918209404 HC GAIT TRAINING EA 15 MIN 3/4/2020 Mili Vasquez, PT GP 1    58439729810 HC PT THERAPEUTIC ACT EA 15 MIN 3/4/2020 Mili Vasquez, PT GP 1               Mili Vasquez PT  3/4/2020         Electronically signed by Mili Vasquez PT at 03/04/20 1515       Occupational Therapy Notes (last 24 hours) (Notes from 03/04/20 1136 through 03/05/20 1136)    No notes exist for this encounter.           Discharge Summary    No notes of this type exist for this encounter.         Discharge Order (From admission, onward)     Start     Ordered    03/05/20 1111  Discharge patient  Once     Expected Discharge Date:  03/05/20    Discharge Disposition:  Home or Self Care    Physician of Record for Attribution - Please select from Treatment Team:  KARISSA JIANG [[id number]]    Review needed by CMO to determine Physician of Record:  No       Question Answer Comment   Physician of Record for Attribution - Please select from Treatment Team KARISSA JIANG    Review needed by CMO to determine Physician of Record No        03/05/20 1113

## 2020-03-05 NOTE — THERAPY TREATMENT NOTE
Acute Care - Physical Therapy Treatment Note   [facility]     Patient Name: Rafael Hussein  : 1923  MRN: [id number]  Today's Date: 3/5/2020     Date of Referral to PT: 20       Admit Date: 2020    Visit Dx:    ICD-10-CM ICD-9-CM   1. Community acquired pneumonia of right lower lobe of lung (CMS/[facility]) J18.1 481   2. Acute respiratory failure with hypoxia (CMS/[facility]) J96.01 518.81   3. Sepsis with acute hypoxic respiratory failure, due to unspecified organism, unspecified whether septic shock present (CMS/[facility]) A41.9 038.9    R65.20 995.91    J96.01 518.81   4. Debility R53.81 799.3   5. Advanced age R54 797   6. Normocytic anemia D64.9 285.9   7. Hypoalbuminemia E88.09 273.8   8. Black lung (CMS/[facility]) J60 500     Patient Active Problem List   Diagnosis   • Community acquired pneumonia of right lower lobe of lung (CMS/[facility])   • Normocytic anemia   • Former smoker   • Advanced age   • Hypoalbuminemia   • Lactic acidosis   • Elevated brain natriuretic peptide (BNP) level   • Elevated troponin   • LBBB (left bundle branch block)   • History of abdominal aortic aneurysm (AAA)   • Black lung (CMS/[facility])   • Macular degeneration   • Emphysema lung (CMS/[facility])       Therapy Treatment    Rehabilitation Treatment Summary     Row Name 20 1300             Treatment Time/Intention    Discipline  physical therapist  -BC      Document Type  evaluation  -BC      Subjective Information  no complaints  -BC      Mode of Treatment  physical therapy  -BC      Patient Effort  poor  -BC      Recorded by [BC] Mili Vasquez, PT 20 1310      Row Name 20 1300             Cognitive Assessment/Intervention- PT/OT    Follows Commands (Cognition)  follows one step commands  -BC      Recorded by [BC] Mili Vasquez, PT 20 1310      Row Name 20 1300             Mobility Assessment/Intervention    Extremity Weight-bearing Status  left lower extremity;right lower extremity  -BC      Left Lower Extremity  (Weight-bearing Status)  full weight-bearing (FWB)  -BC      Right Lower Extremity (Weight-bearing Status)  full weight-bearing (FWB)  -BC      Recorded by [BC] Mili Vasquez, PT 03/05/20 1310      Row Name 03/05/20 1300             Bed Mobility Assessment/Treatment    Bed Mobility Assessment/Treatment  bed mobility (all) activities  -BC      [place] Level (Bed Mobility)  moderate assist (50% patient effort)  -BC      Bed Mobility, Safety Issues  decreased use of arms for pushing/pulling;decreased use of legs for bridging/pushing  -BC      Assistive Device (Bed Mobility)  bed rails  -BC      Recorded by [BC] Mili Vasquez, PT 03/05/20 1310      Row Name 03/05/20 1300             Transfer Assessment/Treatment    Transfer Assessment/Treatment  sit-stand transfer;stand-sit transfer  -BC      Maintains Weight-bearing Status (Transfers)  able to maintain  -BC      Recorded by [BC] Mili Vasquez, PT 03/05/20 1310      Row Name 03/05/20 1300             Sit-Stand Transfer    Sit-Stand [place] (Transfers)  moderate assist (50% patient effort)  -BC      Assistive Device (Sit-Stand Transfers)  walker, front-wheeled  -BC      Recorded by [BC] Mili Vasquez, PT 03/05/20 1310      Row Name 03/05/20 1300             Stand-Sit Transfer    Stand-Sit [place] (Transfers)  moderate assist (50% patient effort)  -BC      Assistive Device (Stand-Sit Transfers)  walker, front-wheeled  -BC      Recorded by [BC] Mili Vasquez, PT 03/05/20 1310      Row Name 03/05/20 1300             Gait/Stairs Assessment/Training    Gait/Stairs Assessment/Training  gait/ambulation independence  -BC      [place] Level (Gait)  moderate assist (50% patient effort)  -BC      Assistive Device (Gait)  walker, front-wheeled  -BC      Distance in Feet (Gait)  65 ft  -BC      Recorded by [BC] Mili Vasquez, PT 03/05/20 1310      Row Name 03/05/20 1300             Coping    Observed Emotional State   accepting;calm;cooperative  -BC      Verbalized Emotional State  acceptance  -BC      Recorded by [BC] Mili Vasquez, PT 03/05/20 1310      Row Name 03/05/20 1300             Plan of Care Review    Plan of Care Reviewed With  patient  -BC      Recorded by [BC] Mili Vasquez, PT 03/05/20 1310        User Key  (r) = Recorded By, (t) = Taken By, (c) = Cosigned By    Initials Name Effective Dates Discipline    BC Mili Vasquez, PT 03/14/16 -  PT                   Physical Therapy Education                 Title: PT OT SLP Therapies (Resolved)     Topic: Physical Therapy (Resolved)     Point: Mobility training (Resolved)     Description:   Instruct learner(s) on safety and technique for assisting patient out of bed, chair or wheelchair.  Instruct in the proper use of assistive devices, such as walker, crutches, cane or brace.              Patient Friendly Description:   It's important to get you on your feet again, but we need to do so in a way that is safe for you. Falling has serious consequences, and your personal safety is the most important thing of all.        When it's time to get out of bed, one of us or a family member will sit next to you on the bed to give you support.     If your doctor or nurse tells you to use a walker, crutches, a cane, or a brace, be sure you use it every time you get out of bed, even if you think you don't need it.    Learning Progress Summary           Patient Acceptance, E, NR by BC at 3/3/2020 1649                   Point: Home exercise program (Resolved)     Description:   Instruct learner(s) on appropriate technique for monitoring, assisting and/or progressing patient with therapeutic exercises and activities.              Learning Progress Summary           Patient Acceptance, E, NR by BC at 3/3/2020 1649                   Point: Body mechanics (Resolved)     Description:   Instruct learner(s) on proper positioning and spine alignment for patient and/or caregiver during  mobility tasks and/or exercises.              Learning Progress Summary           Patient Acceptance, E, NR by BC at 3/3/2020 1649                   Point: Precautions (Resolved)     Description:   Instruct learner(s) on prescribed precautions during mobility and gait tasks              Learning Progress Summary           Patient Acceptance, E, NR by BC at 3/3/2020 1649                               User Key     Initials Effective Dates Name Provider Type [facility] 03/14/16 -  Mili Vasquez, PT Physical Therapist PT                PT Recommendation and Plan     Plan of Care Reviewed With: patient     Time Calculation:   PT Charges     Row Name 03/05/20 1311             Time Calculation    PT Received On  03/05/20  -BC         Time Calculation- PT    Total Timed Code Minutes- PT  30 minute(s)  -BC         Timed Charges    20132 - Gait Training Minutes   15  -BC      70973 - PT Therapeutic Activity Minutes  15  -BC        User Key  (r) = Recorded By, (t) = Taken By, (c) = Cosigned By    Initials Name Provider Type    BC Mili Vasquez, PT Physical Therapist        Therapy Charges for Today     Code Description Service Date Service Provider Modifiers Qty    95619629015 HC GAIT TRAINING EA 15 MIN 3/4/2020 Mili Vasquez, PT GP 1    48344579414 HC PT THERAPEUTIC ACT EA 15 MIN 3/4/2020 Mili Vasquez, PT GP 1    78007621248 HC GAIT TRAINING EA 15 MIN 3/5/2020 Mili Vasquez, PT GP 1    64106924893 HC PT THERAPEUTIC ACT EA 15 MIN 3/5/2020 Mili Vasquez, PT GP 1               Mili Vasquez PT  3/5/2020

## 2020-03-05 NOTE — DISCHARGE SUMMARY
[facility] Medicine Services  DISCHARGE SUMMARY    Date of Admission: 2/29/2020    Date of Discharge:  3/5/2020    PCP: Rio Hardin APRN    Discharging Provider: Elva Hickman PA-C  Attending Physician on day of DC: Dr. 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  · Left ventricular mass index is increased.  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  · Left ventricular systolic function is severely decreased.  · Left ventricular diastolic (grade III) consistent with fixed restricive pattern.  · Left atrial cavity size is severely dilated.  · Moderate aortic valve regurgitation is present.  · Moderate mitral valve regurgitation is present  · The right atrium is also dilated  · The E/E~ is over 40 indicating increase left atrium pressure     Consults:   Consults     Date and Time Order Name Status Description    2/29/2020 1927 Inpatient Cardiology Consult Completed           HPI     History of Present Illness:  Rafael Hussein is a 89 y.o. male male with PMH significant for emphysema, AAA s/p repair, 's pneumoconiosis, and CAD.  He presented to the ED of this facility on 02/29/2020 for further evaluation of chest discomfort and shortness of breath.  He reported  noted dyspnea.  He was found to have acute hypoxic respiratory failure with sepsis 2/2 bibasilar community acquired pneumonia in addition to suspected CHF.        Hospital Course     Hospital Course  Rafael Hussein was admitted as outlined in above HPI.      Mr. Hussein was started on IV ceftriaxone and doxycycline. He initially received IV fluids in the ED given initial blood pressure of 82/38 with improved hemodynamics following fluid administration.  CT chest without contrast revealed moderate bilateral pleural effusions, right> left with bibasilar atelectasis or infiltrates with smaller patchy infiltrates seen in the upper lobes bilaterally, left>right.  Respiratory PCR was unremarkable.  Blood cultures without growth to date.  He was evaluated by speech-language pathology on March 4, 2020 with recommendation of regular diet consistency with thin liquids and no further speech-language pathology follow-up warranted at the time of evaluation on March 4.     Given elevated proBNP in the 6000s and concern for underlying CHF on CXR, echocardiogram was ordered.  Echocardiogram revealed moderately-to-severely dilated left ventricular cavity and grade III diastolic dysfunction with moderate aortic valve regurgitation, moderate mitral valve regurgitation, right atrium dilatation, left atrial cavity severely dilated, and EF estimated to be less than 20%.  Cardiology consultation was placed during admission with initial evaluation by Dr. Philip Forrest on 3/1/2020.  He was started on Entresto and Coreg in addition to Torsemide.   He did have a slight rise in creatinine and bump in BUN following with Torsemide held and Entresto and Coreg continued; however, Entresto was discontinued on 3/3/2020 following hypotension with Dr. Cece Escobedo on board for Cardiology.    As blood pressures and creatinine improved, low-dose lisinopril 2.5 mg was added to regimen.  Coreg was continued and he did tolerate well.  He did have some mild  hypokalemia with supplementation prior to discharge.      Per discussion with Dr. Armijo and cardiology, it was not felt that Mr. Hussein was a candidate for ICD or LifeVest as he is turning 89 in April.  In regard to cardiology follow-up, Mr. Hussein and his daughter wish for him to follow-up with his cardiologist Dr. Cornelio Iglesias at [facility] in [place] ([phone]).  His home amiodarone was not initially started during admission and was not restarted at discharge with newly added Coreg.  Will leave re-initation of home amiodarone to the discretion of his primary Cardiology at follow-up.     During admission, he did ambulate with wheeled walker with PT/OT.  Given advanced age with associated debility, home health & home PT were arranged at the time of discharge.     CBC & CMP have been ordered to be performed by Home Health at the time of evaluation and results sent to his PCP Rio Hardin NP.           Pertinent Laboratory and Radiology Results     Pertinent Test Results:      Results from last 7 days   Lab Units 02/29/20  2330 02/29/20  0908   PH, ARTERIAL pH units 7.346* 7.421   PO2 ART mm Hg 69.6* 53.2*   PCO2, ARTERIAL mm Hg 41.4 35.8   HCO3 ART mmol/L 22.6 23.2     Results from last 7 days   Lab Units 03/05/20  0446 03/04/20  0452 03/03/20  0441 03/03/20  0440 03/02/20  0548 03/01/20  0136 02/29/20  0845   WBC 10*3/mm3 9.44 11.22*  --  13.33* 17.41* 16.09* 12.35*   HEMOGLOBIN g/dL 11.8* 12.2*  --  13.4 12.9* 12.1* 11.7*   HEMATOCRIT % 39.5 40.6  --  45.1 41.1 40.6 39.1   PLATELETS 10*3/mm3 179 198  --  244 236 213 182   MCV fL 93.4 92.7  --  93.4 90.1 94.0 94.2   SODIUM mmol/L 133* 144 141  --  139 144 141   POTASSIUM mmol/L 3.4* 3.9 3.8  --  3.3* 4.3 3.7   CHLORIDE mmol/L 102 110* 106  --  103 107 105   CO2 mmol/L 22.9 22.0 21.9*  --  24.6 23.2 22.0   BUN mg/dL 24* 30* 42*  --  27* 17 18   CREATININE mg/dL 0.84 0.89 1.40*  --  1.17 0.92 0.96   GLUCOSE mg/dL 105* 117* 105*  --  110* 154* 187*   CALCIUM  mg/dL 8.2 7.9* 8.6  --  8.4 8.8 8.6        Results from last 7 days   Lab Units 03/04/20  0452 03/03/20  0441 03/02/20  0548 03/01/20  1316 03/01/20  0725 03/01/20  0136 02/29/20  2243 02/29/20  1718 02/29/20  1034 02/29/20  0845   TROPONIN T ng/mL  --   --   --  0.066* 0.056* 0.063* 0.056* 0.055* 0.039* 0.020   PROBNP pg/mL 2,045.0* 4,983.0* 16,421.0*  --   --  7,741.0*  --   --   --  5,965.0*     Results from last 7 days   Lab Units 03/05/20 0446 03/04/20  0452 03/03/20  0441 03/03/20  0440 03/02/20  0548 03/01/20  0136 02/29/20  1237 02/29/20  0845   CRP mg/dL  --   --  2.65*  --  3.58*  --   --   --    LACTATE mmol/L  --   --   --   --   --   --  2.4* 3.3*   WBC 10*3/mm3 9.44 11.22*  --  13.33* 17.41* 16.09*  --  12.35*     Results from last 7 days   Lab Units 03/04/20 0452 03/02/20  0548 02/29/20  0845   BILIRUBIN mg/dL 0.3 0.4 0.4   ALK PHOS U/L 77 86 90   ALT (SGPT) U/L 19 23 18   AST (SGOT) U/L 23 31 22           Invalid input(s): TG, LDLCALC, LDLREALC  Results from last 7 days   Lab Units 03/01/20  0136 02/29/20  0845   TSH uIU/mL 2.400  --    HEMOGLOBIN A1C %  --  5.80*     Brief Urine Lab Results     None                   Results for orders placed during the hospital encounter of 02/29/20   Transthoracic Echo Complete With Contrast if Necessary Per Protocol    Narrative · The left ventricular cavity is moderate-to-severely dilated.  · Left ventricular mass index is increased.  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  · Left ventricular systolic function is severely decreased.  · Left ventricular diastolic (grade III) consistent with fixed restricive   pattern.  · Left atrial cavity size is severely dilated.  · Moderate aortic valve regurgitation is present.  · Moderate mitral valve regurgitation is present  · The right atrium is also dilated  · The E/E~ is over 40 indicating increase left atrium pressure                             ----------------------------------------------------------------------------------------------------------------------  Ct Chest Without Contrast    Result Date: 2/29/2020  1. Moderate bilateral pleural effusions, right greater than left with bibasilar atelectasis or infiltrates. Smaller patchy infiltrates are seen in the upper lobes bilaterally, left greater than right. 2. Emphysema. 3. Coronary artery calcification. Recommend clinical correlation for coronary artery disease. Signer Name: Arnoldo Bo MD  Signed: 2/29/2020 9:57 PM  Workstation Name: [employer]  Radiology [facility]    Xr Chest 1 View    Result Date: 2/29/2020  Mild diffuse interstitial prominence without focal infiltrates Signer Name: Manoj Bragg MD  Signed: 2/29/2020 11:58 PM  Workstation Name: [employer]  Radiology [facility]    Xr Chest 1 View    Result Date: 2/29/2020  1. CHF/edema superimposed on chronic changes. 2. Basilar airspace disease.  This report was finalized on 2/29/2020 12:34 PM by Dr. Arnoldo Scott MD.      Xr Chest Ap    Result Date: 3/3/2020  Decrease in pulmonary edema compared with 2/29/2020. Signer Name: Arnoldo Bo MD  Signed: 3/3/2020 7:40 AM  Workstation Name: [employer]  [facility]    Fl Video Swallow Single Contrast    Result Date: 3/4/2020   Fleeting laryngeal penetration w/ thin liquids however clears upon completion of swallow. Whole barium pill difficulty passing at the level of the UES, requires multiple presentation of puree for whole barium pill to eventually pass.  This report was finalized on 3/4/2020 3:03 PM by Dr. Maury Bruno MD.          Microbiology Results (last 10 days)     Procedure Component Value - Date/Time    Mycoplasma Pneumoniae Antibody, IgM - Blood, Arm, Left [[id number]]  (Normal) Collected:  02/29/20 1718    Lab Status:  Final result Specimen:  Blood from Arm, Left Updated:  02/29/20 1816     Mycoplasma pneumo IgM Negative     Respiratory Panel, PCR - Swab, Nasopharynx [[id number]]  (Normal) Collected:  02/29/20 1712    Lab Status:  Final result Specimen:  Swab from Nasopharynx Updated:  02/29/20 1839     ADENOVIRUS, PCR Not Detected     Coronavirus 229E Not Detected     Coronavirus HKU1 Not Detected     Coronavirus NL63 Not Detected     Coronavirus OC43 Not Detected     Human Metapneumovirus Not Detected     Human Rhinovirus/Enterovirus Not Detected     Influenza B PCR Not Detected     Parainfluenza Virus 1 Not Detected     Parainfluenza Virus 2 Not Detected     Parainfluenza Virus 3 Not Detected     Parainfluenza Virus 4 Not Detected     Bordetella pertussis pcr Not Detected     Influenza A H1 2009 PCR Not Detected     Chlamydophila pneumoniae PCR Not Detected     Mycoplasma pneumo by PCR Not Detected     Influenza A PCR Not Detected     Influenza A H3 Not Detected     Influenza A H1 Not Detected     RSV, PCR Not Detected    Blood Culture - Blood, Arm, Right [[id number]] Collected:  02/29/20 0910    Lab Status:  Final result Specimen:  Blood from Arm, Right Updated:  03/05/20 1030     Blood Culture No growth at 5 days    Blood Culture - Blood, Arm, Right [[id number]] Collected:  02/29/20 0845    Lab Status:  Final result Specimen:  Blood from Arm, Right Updated:  03/05/20 1030     Blood Culture No growth at 5 days          Labs above have been reviewed on the day of discharge.  Radiology images from prior 30 days were reviewed prior to discharge as incorporated into this document.     Discharge Vitals and Physical Examination       Vital Signs  Temp:  [97.4 °F (36.3 °C)-98.4 °F (36.9 °C)] 98.4 °F (36.9 °C)  Heart Rate:  [75-79] 75  Resp:  [16-18] 18  BP: ()/(48-64) 96/48     PHYSICAL EXAMINATION:   Physical Exam      Discharge Disposition, Discharge Medications, and Discharge Appointments     Discharge Disposition:   home    Condition on Discharge:  Fair    Discharge Medications:     Discharge Medications      New Medications       Instructions Start Date   aspirin 81 MG chewable tablet   81 mg, Oral, Daily   Start Date:  March 6, 2020     carvedilol 3.125 MG tablet  Commonly known as:  COREG   3.125 mg, Oral, 2 Times Daily With Meals, Hold for systolic blood pressure less than 100 and heart rate less than 60      cefdinir 300 MG capsule  Commonly known as:  OMNICEF   300 mg, Oral, Every 12 Hours Scheduled      famotidine 20 MG tablet  Commonly known as:  PEPCID   20 mg, Oral, Daily   Start Date:  March 6, 2020     lactobacillus acidophilus capsule capsule   1 capsule, Oral, Daily   Start Date:  March 6, 2020     lisinopril 2.5 MG tablet  Commonly known as:  PRINIVIL,ZESTRIL   2.5 mg, Oral, Every 24 Hours Scheduled, HOLD IF SYSTOLIC BLOOD PRESSURE IS LESS THAN 110   Start Date:  March 6, 2020     multivitamin tablet tablet   1 tablet, Oral, Daily   Start Date:  March 6, 2020        Continue These Medications      Instructions Start Date   albuterol sulfate  (90 Base) MCG/ACT inhaler  Commonly known as:  PROVENTIL HFA;VENTOLIN HFA;PROAIR HFA   2 puffs, Inhalation, Every 6 Hours PRN      ipratropium-albuterol 0.5-2.5 mg/3 ml nebulizer  Commonly known as:  DUO-NEB   3 mL, Nebulization, Every 4 Hours PRN      nitroglycerin 0.4 MG SL tablet  Commonly known as:  NITROSTAT   0.4 mg, Sublingual, Every 5 Minutes PRN, Take no more than 3 doses in 15 minutes.      potassium chloride 10 MEQ CR tablet  Commonly known as:  K-DUR   10 mEq, Oral, Daily         Stop These Medications    amiodarone 200 MG tablet  Commonly known as:  PACERONE     furosemide 20 MG tablet  Commonly known as:  LASIX     naproxen 375 MG tablet  Commonly known as:  NAPROSYN            Discharged medication regimen discussed with attending physician prior to discharge.     Discharge Diet:   regular diet  Dietary Orders (From admission, onward)     Start     Ordered    03/05/20 1200  Dietary Nutrition Supplements Magic Cup  Daily With Lunch & Dinner     Comments:  Vanilla only   - thank you!   Question:  Select Supplement  Answer:  Magic Cup    03/05/20 1110    03/01/20 1307  Diet Regular; Daily Fluid Restriction; 1500 mL Fluid Per Day  Diet Effective Now     Question Answer Comment   Diet Texture / Consistency Regular    Common Modifiers Daily Fluid Restriction    Fluid Restriction Per Day: 1500 mL Fluid Per Day        03/01/20 1306                Activity at Discharge:  activity as tolerated         Discharge Disposition:    Home or Self Care        Follow-up Appointments:  Your Scheduled Appointments     Pt  Has  An  Apt  With  Rio hardin  For   March 16  At  8 :30   And  Cornelio nicole  For  March 26    At  9  am               Additional Instructions for the Follow-ups that You Need to Schedule     Discharge Follow-up with PCP   As directed       Currently Documented PCP:    Rio Hardin APRN    PCP Phone Number:    [phone]     Follow Up Details:  One week hospitalization with pneuomonia and CHF         Discharge Follow-up with Specified Provider: Dr. Cornelio holder [phone] (this is his cardiologist per his Daughter and they prefer to follow-up with him); 2 Weeks   As directed      To:  Dr. Cornelio holder [phone] (this is his cardiologist per his Daughter and they prefer to follow-up with him)    Follow Up:  2 Weeks    Follow Up Details:  Hospital follow-up CHF         Referral to Home Health   As directed      Obtain BMP on 3/9    Order Comments:  Obtain BMP on 3/9     Face to Face Visit Date:  3/5/2020    Follow-up provider for Plan of Care?:  I treated the patient in an acute care facility and will not continue treatment after discharge.    Follow-up provider:  TRACIE MARIE [[id number]]    Reason/Clinical Findings:  Debility in the setting of advanced age and heart failure    Describe mobility limitations that make leaving home difficult:  Debility in the setting of advanced age and heart failure    Nursing/Therapeutic Services Requested:  Physical  Therapy Skilled Nursing    Skilled nursing orders:  CHF management    Frequency:  1 Week 1         CBC & Differential    Mar 10, 2020 (Approximate)      Home health to perform    Order Comments:  Home health to perform     Manual Differential:  No         Comprehensive Metabolic Panel    Mar 10, 2020 (Approximate)      Home Health to perform    Order Comments:  Home Health to perform             Contact information for follow-up providers     Rio Hardin APRN .    Specialty:  Family Medicine  Why:  One week hospitalization with pneuomonia and CHF  Contact information:  [address]  [place] [address]  [phone]                   Contact information for after-discharge care     Home Medical Care     [facility] .    Service:  Home Health Services  Contact information:  [address] [facility] [address]  [phone]                             Additional Instructions for the Follow-ups that You Need to Schedule     Discharge Follow-up with PCP   As directed       Currently Documented PCP:    Rio Hardin APRN    PCP Phone Number:    [phone]     Follow Up Details:  One week hospitalization with pneuomonia and CHF         Discharge Follow-up with Specified Provider: Dr. Cornelio holder [phone] (this is his cardiologist per his Daughter and they prefer to follow-up with him); 2 Weeks   As directed      To:  Dr. Cornelio holder [phone] (this is his cardiologist per his Daughter and they prefer to follow-up with him)    Follow Up:  2 Weeks    Follow Up Details:  Hospital follow-up CHF         Referral to Home Health   As directed      Obtain BMP on 3/9    Order Comments:  Obtain BMP on 3/9     Face to Face Visit Date:  3/5/2020    Follow-up provider for Plan of Care?:  I treated the patient in an acute care facility and will not continue treatment after discharge.    Follow-up provider:  TRACIE MARIE [[id number]]    Reason/Clinical Findings:  Debility in the setting of  advanced age and heart failure    Describe mobility limitations that make leaving home difficult:  Debility in the setting of advanced age and heart failure    Nursing/Therapeutic Services Requested:  Physical Therapy Skilled Nursing    Skilled nursing orders:  CHF management    Frequency:  1 Week 1         CBC & Differential    Mar 10, 2020 (Approximate)      Home health to perform    Order Comments:  Home health to perform     Manual Differential:  No         Comprehensive Metabolic Panel    Mar 10, 2020 (Approximate)      Home Health to perform    Order Comments:  Home Health to perform                Test Results Pending at Discharge:           Elva Hickman PA-C  [facility] Medicine Team  03/05/20  12:25 PM      Time: Greater than 30 minutes spent on this discharge.

## 2020-03-05 NOTE — PROGRESS NOTES
Discharge Planning Assessment   Kushal     Patient Name: Rafael Hussein  MRN: [id number]  Today's Date: 3/5/2020    Admit Date: 2/29/2020        Discharge Plan     Row Name 03/05/20 1154       Plan    Final Discharge Disposition Code  06 - home with home health care    Final Note  Pt to be discharged home on this date with Physician ordered home health, bedside commode, rolling walker and hospital bed.  Pt and daughter stated preference for [facility] and no preference for DME provider.  SS made referral to [facility] per Cheri and faxed orders to agency.  SS made referral to [facility] and faxed orders to agency.  [facility] to deliver DME to pt's home per pt family request.  RN to call report to [facility]  prior to discharge.                 Sharifa Cid, BSW

## 2020-03-05 NOTE — PLAN OF CARE
Pt has attempted several times to get out of bed without assistance. Pt remaining weak w/ unsteady gait. Bed alarm on, reorientation provided. Bed locked, low position, call light within reach. Room near unit station.

## 2020-03-05 NOTE — DISCHARGE PLACEMENT REQUEST
"Tina Raya (89 y.o. Male)     Date of Birth Social Security Number Address Home Phone MRN    04/28/1923  [address]   [place] [address] [phone] [id number]    Gnosticist Marital Status          None        Admission Date Admission Type Admitting Provider Attending Provider Department, Room/Bed    2/29/20 Emergency Karthik Hernandez MD Heinss, Karl F, MD [address], 3306/2S    Discharge Date Discharge Disposition Discharge Destination         Home or Self Care              Attending Provider:  Quentin Oliveros MD    Allergies:  Penicillins    Isolation:  None   Infection:  None   Code Status:  CPR    Ht:  172.7 cm (67.99\")   Wt:  63.5 kg (139 lb 14.4 oz)    Admission Cmt:  None   Principal Problem:  None                Active Insurance as of 2/29/2020     Primary Coverage     Payor Plan Insurance Group Employer/Plan Group    MEDICARE MEDICARE A & B      Payor Plan Address Payor Plan Phone Number Payor Plan Fax Number Effective Dates    [address] [phone]  1/1/1974 - None Entered    [place] [address]       Subscriber Name Subscriber Birth Date Member ID       TINA RAYA 4/28/1923 [id number]                 Emergency Contacts      (Rel.) Home Phone Work Phone Mobile Phone    DARRION RAYA (Daughter) -- -- [phone]            Emergency Contact Information     Name Relation Home Work Mobile    DARRION RAYA Daughter   [phone]          Insurance Information                MEDICARE/MEDICARE A & B Phone: [phone]    Subscriber: Tina Raya Subscriber#: [id number]    Group#:  Precert#:           Treatment Team     Provider Relationship Specialty Contact    Quentin Oliveros MD Attending, Physician of Record Hospitalist [phone]    Elva Hickman PA-C Physician Assistant Physician Assistant [phone]    Mili Vasquez, PT Physical Therapist Physical Therapy     [facility] [place] [facility] --     Kate Harris, RRT " Respiratory Therapist -- [id number]    Paulo Auguste RN Registered Nurse --           Problem List           Codes Noted - Resolved       Hospital    Community acquired pneumonia of right lower lobe of lung (CMS/HCC) ICD-10-CM: J18.1  ICD-9-CM: 481 2/29/2020 - Present    Normocytic anemia ICD-10-CM: D64.9  ICD-9-CM: 285.9 2/29/2020 - Present    Former smoker (Chronic) ICD-10-CM: Z87.891  ICD-9-CM: V15.82 2/29/2020 - Present    Advanced age (Chronic) ICD-10-CM: R54  ICD-9-CM: 797 2/29/2020 - Present    Hypoalbuminemia ICD-10-CM: E88.09  ICD-9-CM: 273.8 2/29/2020 - Present    Lactic acidosis ICD-10-CM: E87.2  ICD-9-CM: 276.2 2/29/2020 - Present    Elevated brain natriuretic peptide (BNP) level ICD-10-CM: R79.89  ICD-9-CM: 790.99 2/29/2020 - Present    Elevated troponin ICD-10-CM: R79.89  ICD-9-CM: 790.6 2/29/2020 - Present    LBBB (left bundle branch block) ICD-10-CM: I44.7  ICD-9-CM: 426.3 2/29/2020 - Present    History of abdominal aortic aneurysm (AAA) (Chronic) ICD-10-CM: Z86.79  ICD-9-CM: V12.59 2/29/2020 - Present    Black lung (CMS/HCC) (Chronic) ICD-10-CM: J60  ICD-9-CM: 500 2/29/2020 - Present    Macular degeneration (Chronic) ICD-10-CM: H35.30  ICD-9-CM: 362.50 2/29/2020 - Present    Emphysema lung (CMS/HCC) (Chronic) ICD-10-CM: J43.9  ICD-9-CM: 492.8 2/29/2020 - Present             History & Physical      Ketty Reddy PA at 02/29/20 1527     Attestation signed by Karthik Hernandez MD at 02/29/20 1926 (Updated)    Mr. Hussein is our 89 yo M with hx of emphysema, AAA s/p repair, 's pneumoconiosis, CAD who presents with chest discomfort and shortness of breath. Patient noted dyspnea     Patient is a poor historian and has given different accounts. From the account he gave me, he reports getting three stents in the past and the shortness of breath and discomfort he is experiencing is similar to those episodes. He notes pain and dyspnea is exertional. He notes no history of heart failure but is on lasix  at ome for edema.     Patient denies any fevers/chills or cough.     Exam:  Constitutional: Elderly appearing male. No acute distress.   HENT: Mucosa moist. Neck supple.   Pulmonary: Rales in bases bilaterally. No wheezing.   Cardiac: RRR, no m/r/g  Abdominal: Soft. Bowel sounds are normal. He exhibits no distension and no mass. There is no guarding.   Musculoskeletal: Normal range of motion. He exhibits no edema.   Neurological: AAOX3. Poor short term memory. No focal neurological deficit.   Skin: Skin is warm and dry. No rashes.   Psychiatric: He has a normal mood and affect.       A/P:  #Acute hypoxic respiratory failure   #Sepsis 2/2 bibasilar community acquired pneumonia   #Suspected CHF  - ABG on presentation 7.42/35/53 on room air   - He was tachypnic hypoxic, and had leukocytosis at presentation with potential source being pneumonia   - Plain film of chest revealed bilateral airspace disease, fluid vs. Infection   - ProBNP elevated at 6k in setting of normal renal function supportive of heart failure  - Given fluid in ED with improved hemodynamics supportive of pneumonia   - May be multifactiorial   - Will continue ceftriaxone doxycyline. Will get procal and CT chest. Will get echo.   - Will hold further fluids/diuretics.     #NSEMI   #Hx of CAD?  #Exertional dyspnea   #LBBB of unclear chronicity   - Troponin trending up since admission 0.020=>0.039=>0.055  - EKG reviewed. LBBB but no ST depression or STEMI noted.   - Chest pain with typical features but patient a poor historian.  - Suspect Type II NSTEMI in setting of heart failure vs. Pneumonia   - Will start ASA/Statin. Will get echocardiogram as above.    - Will consult cardiology for further risk stratification.     #Normocytic anemia   - Unclear hemoglobin baseline. 11.7 on presentaion. No signs of bleeding.     #Hx of AAA s/p repair   - Hx not consistent with dissection. Continue to monitor.     #Malnutrition  - w/ hypoalbuminemia   - Will consult  "nutrition     #Advanced age and related debility   - PT/OT consulted     F: PO  E: Replace as needed   N: Cardiac     Dispo: Admit to tele.     Code status: Full. Will continue to discuss more when family is available. Patient has daughter he lives with.                          [facility] Medicine Services  HISTORY & PHYSICAL    Patient Identification:  Name:  Rafael Hussein  Age:  89 y.o.  Sex:  male  :  1923  MRN:  [id number]   Visit Number:  [id number]  Primary Care Physician:  Rio Hardin APRN     Subjective     Chief complaint:   Chief Complaint   Patient presents with   • Chest Pain     History of presenting illness:   Patient is a 89 y.o. male with past medical history significant for non-oxygen dependent emphysema, AAA status post repair in the past, black lung, ocular degeneration, and advanced age that presented to the [facility] emergency department for evaluation of chest pain shortness of breath.  It is of note that the patient is a poor historian, he lives with his daughter who is also a poor historian.  His daughter does aid in some history of presenting illness but is limited.  Thus, some history is taken per ED staff and chart review as well as nursing staff.  Per the patient, last night he developed \" heart pains\" for which he describes as dyspnea.  On detailed questioning, he denies any specific pain, no radiating pain or pressure in his chest.  He mainly describes dyspnea, worse with exertion.  Patient states that this morning he was getting up out of bed when he had acute onset again of the \" heart pains\".  Patient states that this point he yelled for his daughter to come and check on him, who then called EMS and brought the patient in for ED evaluation.  Patient states he has had episodes like this in the past but not as severe.  Upon further questioning, he does report having heart caths x3, unsure if he has had stents.  No history of open " heart surgery.  He has had AAA status post repair in the past.  Per the daughter at bedside, he follows with a Cornelio Darby with cardiology in [place] and she states that we can contact their office for any records as she is unsure of specific details.  Upon further questioning, patient's daughter does report that approximately 4 weeks ago he was diagnosed with pneumonia and was prescribed antibiotics.  Patient states that his cough and symptoms did improve, however over the past 24 to 48 hours he has again developed a productive cough with yellow/green sputum.  Denies any known fevers or chills.  He does not wear home oxygen.  He is on Lasix at home, daughter states this is for swelling of his feet and legs.    Upon arrival to the ED, vitals were temperature 98.1, heart rate 66, respiratory rate 22, blood pressure 82/38, and oxygen saturation 94% on room air.  Initial troponin T 0.020 with repeat 0.039.  proBNP 5965.  CMP with glucose 18 and albumin 3.21, otherwise unremarkable.  Initial lactic acid 3.3.  CBC with white blood cell count 12.35, hemoglobin 11.7, neutrophil percentage 79.5%, and absolute neutrophil count 9.2.  ABG with pH 7.421, PCO2 35.8, PO2 53.2, HCO3 23.2, and oxygen saturation 88.9% on room air.  Chest x-ray with CHF/edema superimposed on chronic changes, basilar airspace disease.  While emergency department, patient was administered 650 mg p.o. Tylenol.  Patient was given empiric dose of IV Levaquin 750 mg.  Patient also received 1.8 L normal saline.  IV fluids, patient's blood pressure did stabilize.    Patient has been admitted to the telemetry floor for further evaluation and treatment.     Present during exam: Patient's daughter   ---------------------------------------------------------------------------------------------------------------------   Review of Systems   Constitutional: Negative for activity change, appetite change, chills, diaphoresis, fatigue and fever.   HENT: Negative for  congestion, postnasal drip, rhinorrhea, sinus pain, sore throat and trouble swallowing.    Eyes: Negative for discharge and visual disturbance.   Respiratory: Positive for shortness of breath. Negative for cough (yellow/green sputum production ), chest tightness and wheezing.    Cardiovascular: Positive for chest pain. Negative for palpitations and leg swelling.   Gastrointestinal: Negative for abdominal pain, constipation, diarrhea, nausea and vomiting.   Endocrine: Negative for cold intolerance and heat intolerance.   Genitourinary: Negative for decreased urine volume, dysuria, frequency and urgency.   Musculoskeletal: Negative for arthralgias, gait problem and myalgias.   Skin: Negative for color change, rash and wound.   Allergic/Immunologic: Negative for environmental allergies and immunocompromised state.   Neurological: Negative for dizziness, syncope, weakness, light-headedness and headaches.   Hematological: Negative for adenopathy. Does not bruise/bleed easily.   Psychiatric/Behavioral: Negative for confusion and decreased concentration. The patient is not nervous/anxious.       ---------------------------------------------------------------------------------------------------------------------   Past Medical History:   Diagnosis Date   • Aortic aneurysm (CMS/HCC)    • Black lung (CMS/[facility])    • Emphysema lung (CMS/[facility]) 2/29/2020   • History of abdominal aortic aneurysm (AAA) 2/29/2020   • Macular degeneration 2/29/2020     Past Surgical History:   Procedure Laterality Date   • ABDOMINAL AORTIC ANEURYSM REPAIR     • CARDIAC CATHETERIZATION      x3      History reviewed. No pertinent family history.  Social History     Socioeconomic History   • Marital status:      Spouse name: Not on file   • Number of children: Not on file   • Years of education: Not on file   • Highest education level: Not on file   Tobacco Use   • Smoking status: Former Smoker     Types: Cigarettes, Pipe     Last attempt to quit:  1989     Years since quittin.0   • Smokeless tobacco: Never Used   Substance and Sexual Activity   • Alcohol use: Never     Frequency: Never   • Drug use: Never   • Sexual activity: Defer     ---------------------------------------------------------------------------------------------------------------------   Allergies:  Penicillins  ---------------------------------------------------------------------------------------------------------------------   Medications below are reported home medications pulling from within the system; at this time, these medications have not been reconciled unless otherwise specified and are in the verification process for further verifcation as current home medications.    Prior to Admission Medications     Prescriptions Last Dose Informant Patient Reported? Taking?    acetaminophen-codeine (TYLENOL #3) 300-30 MG per tablet   Yes Yes    Take 1 tablet by mouth Every 8 (Eight) Hours As Needed for Moderate Pain .    albuterol sulfate  (90 Base) MCG/ACT inhaler   Yes Yes    Inhale 2 puffs Every 6 (Six) Hours As Needed for Wheezing.    amiodarone (PACERONE) 200 MG tablet   Yes Yes    Take 200 mg by mouth Daily.    amoxicillin (AMOXIL) 500 MG capsule   Yes Yes    Take 1,000 mg by mouth 2 (Two) Times a Day.    brimonidine (ALPHAGAN) 0.2 % ophthalmic solution   Yes Yes    Administer 1 drop to the right eye 2 (Two) Times a Day.    cefdinir (OMNICEF) 300 MG capsule   Yes Yes    Take 300 mg by mouth 2 (Two) Times a Day.    doxycycline (VIBRAMYCIN) 100 MG capsule   Yes Yes    Take 100 mg by mouth 2 (Two) Times a Day.    Ergocalciferol (VITAMIN D2 PO)   Yes Yes    Take 1.25 mg by mouth.    furosemide (LASIX) 20 MG tablet   Yes Yes    Take 20 mg by mouth Daily.    guaiFENesin 200 MG tablet   Yes Yes    Take 400 mg by mouth 2 (Two) Times a Day.    ipratropium-albuterol (DUO-NEB) 0.5-2.5 mg/3 ml nebulizer   Yes Yes    Take 3 mL by nebulization Every 4 (Four) Hours As Needed for  Wheezing.    naproxen (NAPROSYN) 375 MG tablet   Yes Yes    Take 375 mg by mouth 2 (Two) Times a Day As Needed for Mild Pain .    nitroglycerin (NITROSTAT) 0.4 MG SL tablet   Yes Yes    Place 0.4 mg under the tongue Every 5 (Five) Minutes As Needed for Chest Pain. Take no more than 3 doses in 15 minutes.    potassium chloride (K-DUR) 10 MEQ CR tablet   Yes Yes    Take 10 mEq by mouth Daily.    predniSONE (DELTASONE) 20 MG tablet   Yes Yes    Take 20 mg by mouth Daily.    tiotropium (SPIRIVA) 18 MCG per inhalation capsule   Yes Yes    Place 1 capsule into inhaler and inhale Daily.        ---------------------------------------------------------------------------------------------------------------------    Objective     Hospital Scheduled Meds:    cefTRIAXone 1 g Intravenous Q24H   doxycycline 100 mg Intravenous Q12H   heparin (porcine) 5,000 Units Subcutaneous Q8H   ipratropium 0.5 mg Nebulization Q6H   multivitamin 1 tablet Oral Daily   sodium chloride 10 mL Intravenous Q12H          Current listed hospital scheduled medications may not yet reflect those currently placed in orders that are signed and held, awaiting patient's arrival to floor/unit.    ---------------------------------------------------------------------------------------------------------------------   Vital Signs:  Temp:  [98.1 °F (36.7 °C)-98.4 °F (36.9 °C)] 98.4 °F (36.9 °C)  Heart Rate:  [63-96] 69  Resp:  [16-22] 18  BP: ()/(38-95) 114/58  Mean Arterial Pressure (Non-Invasive) for the past 24 hrs (Last 3 readings):   Noninvasive MAP (mmHg)   02/29/20 1634 86   02/29/20 1612 68   02/29/20 1602 76     SpO2 Percentage    02/29/20 1602 02/29/20 1612 02/29/20 1634   SpO2: 100% 100% 96%     SpO2:  [85 %-100 %] 96 %  on   ;        Body mass index is 21.79 kg/m².  Wt Readings from Last 3 Encounters:   02/29/20 61.2 kg (135 lb)          ---------------------------------------------------------------------------------------------------------------------   Physical Exam:  Physical Exam   Constitutional: He is oriented to person, place, and time. He appears well-developed and well-nourished.  Non-toxic appearance. He appears ill (Chronically ). No distress. Nasal cannula in place.   Elderly, pleasant, sitting up on edge of bed, no acute distress noted. Daughter present at bedside.   HENT:   Head: Normocephalic and atraumatic.   Right Ear: External ear normal.   Left Ear: External ear normal.   Nose: Nose normal.   Mouth/Throat: Oropharynx is clear and moist and mucous membranes are normal. No oropharyngeal exudate.   Eyes: Pupils are equal, round, and reactive to light. Conjunctivae are normal. No scleral icterus.   Degenerative changes of sclera/conjunctiva, pt with hx of chronic macular degeneration and decreased vision   Neck: Neck supple. No JVD present. Carotid bruit is not present.   Cardiovascular: Normal rate, regular rhythm, normal heart sounds and intact distal pulses. Exam reveals no gallop and no friction rub.   No murmur heard.  Pulses:       Dorsalis pedis pulses are 2+ on the right side, and 2+ on the left side.        Posterior tibial pulses are 2+ on the right side, and 2+ on the left side.   Pulmonary/Chest: Effort normal. No respiratory distress. He has no wheezes. He has no rhonchi. He has rales in the right lower field and the left lower field. He exhibits no tenderness.   Abdominal: Soft. Bowel sounds are normal. He exhibits no distension and no mass. There is no hepatosplenomegaly. There is no tenderness. There is no rebound and no guarding. No hernia.   Genitourinary:   Genitourinary Comments: No ochoa catheter in place, making urine.   Musculoskeletal: He exhibits no tenderness or deformity.        Right lower leg: He exhibits edema (trace).        Left lower leg: He exhibits edema (trace).     Vascular Status -  His right  foot exhibits normal foot vasculature . His left foot exhibits normal foot vasculature .  Neurological: He is alert and oriented to person, place, and time. He displays atrophy. No cranial nerve deficit or sensory deficit.   Awake and alert. Follows commands. Answers questions appropriately. Moves all extremities equally. Global age related muscular atrophy but symmetrical. Sensation intact. No focal neuro deficits on exam.   Skin: Skin is warm and dry. Capillary refill takes less than 2 seconds. No rash noted. No erythema. No pallor.   Varicose veins bilateral lower extremities consistent with chronic venous stasis    Psychiatric: He has a normal mood and affect. His speech is normal and behavior is normal. Judgment and thought content normal.   Nursing note and vitals reviewed.    ---------------------------------------------------------------------------------------------------------------------  EKG:      Telemetry:    Sinus 70s     I have personally reviewed the EKG/Telemetry strip  ---------------------------------------------------------------------------------------------------------------------   Results from last 7 days   Lab Units 02/29/20  1718 02/29/20  1034 02/29/20  0845   TROPONIN T ng/mL 0.055* 0.039* 0.020     Results from last 7 days   Lab Units 02/29/20  0845   PROBNP pg/mL 5,965.0*       Results from last 7 days   Lab Units 02/29/20  0908   PH, ARTERIAL pH units 7.421   PO2 ART mm Hg 53.2*   PCO2, ARTERIAL mm Hg 35.8   HCO3 ART mmol/L 23.2     Results from last 7 days   Lab Units 02/29/20  1237 02/29/20  0845   LACTATE mmol/L 2.4* 3.3*   WBC 10*3/mm3  --  12.35*   HEMOGLOBIN g/dL  --  11.7*   HEMATOCRIT %  --  39.1   MCV fL  --  94.2   MCHC g/dL  --  29.9*   PLATELETS 10*3/mm3  --  182     Results from last 7 days   Lab Units 02/29/20  0845   SODIUM mmol/L 141   POTASSIUM mmol/L 3.7   CHLORIDE mmol/L 105   CO2 mmol/L 22.0   BUN mg/dL 18   CREATININE mg/dL 0.96   EGFR IF NONAFRICN AM mL/min/1.73  73   CALCIUM mg/dL 8.6   GLUCOSE mg/dL 187*   ALBUMIN g/dL 3.21*   BILIRUBIN mg/dL 0.4   ALK PHOS U/L 90   AST (SGOT) U/L 22   ALT (SGPT) U/L 18   Estimated Creatinine Clearance: 39 mL/min (by C-G formula based on SCr of 0.96 mg/dL).  Lab Results   Component Value Date    HGBA1C 5.80 (H) 02/29/2020     No results found for: TSH, FREET4    Microbiology Results (last 10 days)     ** No results found for the last 240 hours. **         I have personally reviewed the above laboratory results.   ---------------------------------------------------------------------------------------------------------------------  Imaging Results (Last 7 Days)     Procedure Component Value Units Date/Time    XR Chest 1 View [[id number]] Collected:  02/29/20 1233     Updated:  02/29/20 1236    Narrative:       EXAMINATION: XR CHEST 1 VW-      CLINICAL INDICATION:     Chest pain     TECHNIQUE:  XR CHEST 1 VW-      COMPARISON: NONE      FINDINGS:      Bilateral interstitial thickening most consistent with edema. Underlying  chronic lung changes are noted. Patchy basilar predominant airspace  disease represents atelectasis, edema, or pneumonia.   Cardiomegaly. Pulmonary vascular congestion.   No pneumothorax.   Trace effusions.   No acute osseous findings.          Impression:       1. CHF/edema superimposed on chronic changes.  2. Basilar airspace disease.     This report was finalized on 2/29/2020 12:34 PM by Dr. Arnoldo Scott MD.           I have personally reviewed the above radiology results.   ---------------------------------------------------------------------------------------------------------------------    Assessment & Plan      · Severe sepsis secondary to bibasilar community acquired pneumonia: Patient meets severe sepsis criteria with lactic acidosis, respiratory rate greater than 20, hypotension present at time of presentation, acute hypoxic respiratory failure and mild leukocytosis.  Blood cultures obtained in ED, add  respiratory culture and respiratory panel PCR and follow for final results.  Continue empiric treatment with IV Rocephin and doxycycline pending atypical work-up.  Supplemental oxygen to touch SPO2 greater than 90%.  Obtain pro calcitonin level.  Trend lactic acid until normalized.  Repeat CBC in a.m.  · Suspected CHF with probable mild acute exacerbation: No echo on file.  However patient does have appearance on chest x-ray consistent with CHF and rales on exam.  No lower extremity significant edema.  Could be related to pneumonia but may also have underlying CHF.  Patient and daughter at bedside unsure of this diagnosis.  Does appear that he is on Lasix at home.  Patient was hypotensive at time of presentation but did respond with IV fluids, therefore convoluted on CHF versus pneumonia.  Will hold off on diuresis as patient was hypotensive at time of presentation.  Hold off on any further IV fluids.  Closely monitor volume status, strict I's and O's and daily weights.  Treat pneumonia as outlined above.  Repeat proBNP in a.m.   · Acute hypoxic respiratory failure: Likely secondary to combination of above.  Supplemental oxygen titrate SPO2 greater than 90%.  Continuous pulse oximetry monitoring.  · Atypical chest pain with dyspnea on exertion with mild troponin elevation: Again this could be due to underlying CHF.  Patient does have mild troponin elevation.  He does have risk factors.  Uncertain whether his had stents in the past.  Obtain records from patient's cardiologist in [facility], Cornelio Darby.  Will trend troponin.  Daily aspirin.  Telemetry monitoring.  Repeat EKG in a.m.  · Left bundle branch block: Unknown chronicity.  Obtain records from PCP/cardiology office.  Repeat EKG in a.m.  Telemetry monitoring.  Trend troponin.  QTC is prolonged, however distorted due to left bundle branch block.  Avoid any further prolonging agents, Levaquin given in ED, discontinued.  Monitor electrolytes and replace  per protocol.  · Normocytic anemia: Hemoglobin stable without active bleeding.  Monitor closely, repeat CBC in a.m.  · Hyperglycemia without history of diabetes mellitus: Obtain hemoglobin A1c.  · Hypoalbuminemia: Likely multifactorial.  Monitor closely.  · History of AAA status post repair  · Age-related debility: PT/OT consults  · Advanced aage  · F/E/N: No IV fluids.  Replace electrolytes per protocol.  Regular diet.    ---------------------------------------------------  DVT Prophylaxis: Subcutaneous heparin  GI Prophylaxis: Pepcid  Activity: Up with assistance, falls precautions    The patient is considered to be a high risk patient due to: Severe sepsis, pneumonia, probable CHF exacerbation, chest pain, dyspnea on exertion, left bundle branch block, and advanced age    INPATIENT status due to the need for care which can only be reasonably provided in an hospital setting such as aggressive/expedited ancillary services and/or consultation services, the necessity for IV medications, close physician monitoring and/or the possible need for procedures.  In such, I feel patient’s risk for adverse outcomes and need for care warrant INPATIENT evaluation and predict the patient’s care encounter to likely last beyond 2 midnights.    Code Status: FULL CODE    I have discussed the patient's assessment and plan with the patient, patient's daughter at bedside, and AM RN Paulo as well as attending physician Dr. Hernandez.       Ketty Reddy PA-C  Hospitalist Service -- [facility]   Pager: [phone]    02/29/20  6:13 PM    Attending Physician: Dr. Mary MD      ---------------------------------------------------------------------------------------------------------------------          Electronically signed by Karthik Hernandez MD at 02/29/20 1926       Physician Progress Notes (last 24 hours) (Notes from 03/04/20 1153 through 03/05/20 1153)    No notes of this type exist for this encounter.         Consult  Notes (last 24 hours) (Notes from 03/04/20 1153 through 03/05/20 1153)    No notes of this type exist for this encounter.           Discharge Summary    No notes of this type exist for this encounter.         Discharge Order (From admission, onward)     Start     Ordered    03/05/20 1111  Discharge patient  Once     Expected Discharge Date:  03/05/20    Discharge Disposition:  Home or Self Care    Physician of Record for Attribution - Please select from Treatment Team:  KARISSA JIANG [[id number]]    Review needed by CMO to determine Physician of Record:  No       Question Answer Comment   Physician of Record for Attribution - Please select from Treatment Team KARISSA JIANG    Review needed by CMO to determine Physician of Record No        03/05/20 1113

## 2020-03-05 NOTE — DISCHARGE INSTR - APPOINTMENTS
Pt  Has  An  Apt  With  [facility]  For   March 16  At  8 :30   And  [facility]  For  March 26    At  9  am

## 2020-03-05 NOTE — PROGRESS NOTES
Discharge Planning Assessment   [place]     Patient Name: Rafael Hussein  MRN: [id number]  Today's Date: 3/5/2020    Admit Date: 2/29/2020        Discharge Plan     Row Name 03/05/20 1223       Plan    Plan  SS spoke with pt regarding short term nursing home placement for rehab.  Pt continues to not be agreeable for placement.  Pt is agreeable for home health services and home helpers referral.  SS made referral to [facility].  [facility] to follow up with pt and family    Row Name 03/05/20 1150       Plan    Final Discharge Disposition Code  06 - home with home health care    Final Note  Pt to be discharged home on this date with Physician ordered home health, bedside commode, rolling walker and hospital bed.  Pt and daughter stated preference for [facility] and no preference for DME provider.  SS made referral to [facility] per Cheri and faxed orders to agency.  SS made referral to [facility] and faxed orders to agency.  [facility] to deliver DME to pt's home per pt family request.  RN to call report to [facility] prior to discharge.            Home Medical Care - Selection Complete      Service Provider Request Status Selected Services Address Phone Number Fax Number    [facility] Selected Home Health Services [address] [place] [id number] [phone] --        RUDDY Salas

## 2020-03-06 ENCOUNTER — READMISSION MANAGEMENT (OUTPATIENT)
Dept: CALL CENTER | Facility: HOSPITAL | Age: 85
End: 2020-03-06

## 2020-03-06 NOTE — OUTREACH NOTE
Prep Survey      Responses   [facility] facility patient discharged from?  [facility]   Is LACE score < 7 ?  No   Eligibility  Readm Mgmt   Discharge diagnosis  CHF,  sepsis/PNA,  CARLEEN,  hypokalemia,  anemia,  coal miners pneumoconiosis and emphysema   Does the patient have one of the following disease processes/diagnoses(primary or secondary)?  Sepsis   Does the patient have Home health ordered?  Yes   What is the Home health agency?   [facility]    Is there a DME ordered?  Yes   What DME was ordered?  [facility], RW, hospital bed   Comments regarding appointments  see AVS   Medication alerts for this patient  multiple new meds,  STOP lasix, pacerone and naproxen   Prep survey completed?  Yes          Gracy Auguste RN

## 2020-03-09 ENCOUNTER — READMISSION MANAGEMENT (OUTPATIENT)
Dept: CALL CENTER | Facility: HOSPITAL | Age: 85
End: 2020-03-09